# Patient Record
Sex: MALE | Race: ASIAN | NOT HISPANIC OR LATINO
[De-identification: names, ages, dates, MRNs, and addresses within clinical notes are randomized per-mention and may not be internally consistent; named-entity substitution may affect disease eponyms.]

---

## 2023-04-06 PROBLEM — Z00.00 ENCOUNTER FOR PREVENTIVE HEALTH EXAMINATION: Status: ACTIVE | Noted: 2023-04-06

## 2023-04-10 ENCOUNTER — APPOINTMENT (OUTPATIENT)
Dept: SURGERY | Facility: CLINIC | Age: 73
End: 2023-04-10
Payer: MEDICARE

## 2023-04-10 VITALS
HEART RATE: 64 BPM | HEIGHT: 67 IN | BODY MASS INDEX: 30.76 KG/M2 | DIASTOLIC BLOOD PRESSURE: 83 MMHG | SYSTOLIC BLOOD PRESSURE: 125 MMHG | WEIGHT: 196 LBS

## 2023-04-10 DIAGNOSIS — Z86.39 PERSONAL HISTORY OF OTHER ENDOCRINE, NUTRITIONAL AND METABOLIC DISEASE: ICD-10-CM

## 2023-04-10 DIAGNOSIS — K80.20 CALCULUS OF GALLBLADDER W/OUT CHOLECYSTITIS W/OUT OBSTRUCTION: ICD-10-CM

## 2023-04-10 DIAGNOSIS — Z78.9 OTHER SPECIFIED HEALTH STATUS: ICD-10-CM

## 2023-04-10 DIAGNOSIS — Z86.79 PERSONAL HISTORY OF OTHER DISEASES OF THE CIRCULATORY SYSTEM: ICD-10-CM

## 2023-04-10 DIAGNOSIS — Z82.49 FAMILY HISTORY OF ISCHEMIC HEART DISEASE AND OTHER DISEASES OF THE CIRCULATORY SYSTEM: ICD-10-CM

## 2023-04-10 DIAGNOSIS — Z87.891 PERSONAL HISTORY OF NICOTINE DEPENDENCE: ICD-10-CM

## 2023-04-10 DIAGNOSIS — Z87.438 PERSONAL HISTORY OF OTHER DISEASES OF MALE GENITAL ORGANS: ICD-10-CM

## 2023-04-10 PROCEDURE — 99203 OFFICE O/P NEW LOW 30 MIN: CPT

## 2023-04-19 PROBLEM — K80.20 CHOLELITHIASIS: Status: ACTIVE | Noted: 2023-04-19

## 2023-04-19 NOTE — HISTORY OF PRESENT ILLNESS
[de-identified] : Mr. SOO CORREIA is a 72 year  old patient who was referred by Dr. Radha Kaba with the chief complaint of having right upper quadrant and epigastric pain on and off  for 3 months. Pain is  radiating to the back. He reports no nausea or vomiting and no history of jaundice, acholia or choluria.   Appetite is good and weight is stable.   He   has no family history of biliary tract disease.   He had an abdominal sonogram on  03/11/2023 which revealed GB stone. CBD is normal

## 2023-04-19 NOTE — PLAN
[FreeTextEntry1] : Patient is with symptomatic cholelithiasis.   Patient is very hesitant to pursue any surgical intervention.  Given presence of gallstones, laparoscopic, possibly open cholecystectomy was discussed. Patient does not wish to pursue surgery at this time. Patient instructed to maintain a fat-free diet, and to seek immediate medical attention with any acute change or worsening of symptoms, including but not limited to abdominal pain, fever, chills, nausea, vomiting, or yellowing of the skin. Patient’s questions and concerns addressed to patient’s satisfaction.  Patient verbalized an understanding of the information discussed. \par \par

## 2023-04-19 NOTE — CONSULT LETTER
[Dear  ___] : Dear  [unfilled], [Consult Letter:] : I had the pleasure of evaluating your patient, [unfilled]. [Please see my note below.] : Please see my note below. [Consult Closing:] : Thank you very much for allowing me to participate in the care of this patient.  If you have any questions, please do not hesitate to contact me. [Sincerely,] : Sincerely, [FreeTextEntry3] : Shon Valle MD, FACS

## 2023-04-19 NOTE — PHYSICAL EXAM
[Abdominal Masses] : No abdominal masses [Abdomen Tenderness] : ~T ~M No abdominal tenderness [Alert] : alert [Oriented to Person] : oriented to person [Oriented to Place] : oriented to place [Oriented to Time] : oriented to time [Calm] : calm [de-identified] : He  is alert, well-groomed, and in NAD\par   [de-identified] : anicteric.  Nasal mucosa pink, septum midline. Oral mucosa pink.  Tongue midline, Pharynx without exudates.\par   [de-identified] : Neck supple. Trachea midline. Thyroid isthmus barely palpable, lobes not felt.\par

## 2023-05-23 VITALS
TEMPERATURE: 98 F | HEART RATE: 65 BPM | SYSTOLIC BLOOD PRESSURE: 131 MMHG | RESPIRATION RATE: 16 BRPM | HEIGHT: 67 IN | WEIGHT: 195.11 LBS | OXYGEN SATURATION: 98 % | DIASTOLIC BLOOD PRESSURE: 79 MMHG

## 2023-05-23 NOTE — H&P ADULT - NSICDXPASTMEDICALHX_GEN_ALL_CORE_FT
PAST MEDICAL HISTORY:  Afib     BPH (benign prostatic hyperplasia)     HLD (hyperlipidemia)     HTN (hypertension)

## 2023-05-23 NOTE — H&P ADULT - CARDIOVASCULAR
normal/regular rate and rhythm/S1 S2 present/no gallops/no rub/no murmur/vascular bending over/standing

## 2023-05-23 NOTE — H&P ADULT - HISTORY OF PRESENT ILLNESS
Cardio: Dr. Echavarria  Pharmacy:  Escort:    74 yo M with PMHx HTN, HLD, BPH, Afib (on Eliquis, last dose _____) . Pt presented to their outpatient cardiologist, Dr. Echavarria, w/ exertional chest pain x 3 weeks, low exercise tolerance. Patient went to hospital in June 2022 w/ syncope and was diagnosed with afib. Continues to have occasional lightheadedness. Underwent abnormal PETCT w/ Ca score 127 (full report below). Pt denies CP/SOB, dizziness, palpitations, orthopnea/PND, leg swelling, LOC, bleeding, melena/hematochezia, fever, chills, URI symptoms, or recent illness.  In light of pts risk factors, CCS class III anginal symptoms and abnormal PETCT, pt now presents to [facility] for recommended cardiac catheterization with possible intervention if clinically indicated.      PETCT 4/28/23: Ca score 127, Results indicate intermediate (30-69%) likelihood for presence of jeopardized myocardium. LCx: a small to medium sized reversible defect in the lateral wall. RCA: a small nonreversible defect involving the inferior wall, with an adjacent small to medium sized reversible defect in the inferior wall. LVEF rest 77% and LVEF stress 81%. LV wall motion demonstrated mild hypokinesis in the inferior wall. Improvement of wall motion in the inferior wall was seen in the ragadenoson images.        Cardio: Dr. Echavarria  Pharmacy:  Escort:    AFib on eliquis, we have called patient over 3 days and not picking up, voicemail box not activated so cant leave a VM; left VM with emergency contact instructing them to have patient reach out to us. Unable to alert them to stop eliquis.     72 yo M with PMHx HTN, HLD, BPH, Afib (on Eliquis, last dose _____) . Pt presented to their outpatient cardiologist, Dr. Echavarria, w/ exertional chest pain x 3 weeks, low exercise tolerance. Patient went to hospital in June 2022 w/ syncope and was diagnosed with afib. Continues to have occasional lightheadedness. Underwent abnormal PETCT  4/28/23 showing LCx: small to medium sized reversible defect in the lateral wall, RCA small nonreversible defect involving the inferior wall, with an adjacent small to medium sized reversible defect in the inferior wall. LVEF 77% at rest. Mild hypokinesis in inferior wall. Ca score 127 (30-69% likelihood of jeopardized myocardium). Pt otherwise denies _____ CP/SOB, dizziness, palpitations, orthopnea/PND, leg swelling, LOC, bleeding, melena/hematochezia, fever, chills, URI symptoms, or recent illness.  In light of pts risk factors, CCS class III anginal symptoms and abnormal PETCT, pt now presents to [facility] for recommended cardiac catheterization with possible intervention if clinically indicated.     Cardio: Dr. Echavarria  Pharmacy: [facility] [phone]  Escort: Daughter    74yo M former smoker PMHx HTN, HLD, BPH, Afib (on Eliquis, last dose 5/27/23). Patient was admitted 6/2022 for syncope and subsequently diagnosed with afib. Endorses occasional lightheadedness. Underwent abnormal PET CT 4/28/23 showing LCx: small to medium sized reversible defect in the lateral wall, RCA small nonreversible defect involving the inferior wall, with an adjacent small to medium sized reversible defect in the inferior wall. LVEF 77% at rest. Mild hypokinesis in inferior wall. Ca score 127 (30-69% likelihood of jeopardized myocardium). Denies CP/SOB, palpitations, orthopnea/PND, leg swelling, LOC, bleeding, melena/hematochezia, fever, chills, or recent illness.  In light of pts risk factors, CCS class III anginal symptoms and abnormal PET CT, pt now presents to [facility] for recommended cardiac catheterization with possible intervention if clinically indicated.

## 2023-05-23 NOTE — H&P ADULT - ASSESSMENT
74yo M former smoker PMHx HTN, HLD, BPH, Afib (on Eliquis, last dose 5/27/23). Patient was admitted 6/2022 for syncope and subsequently diagnosed with afib. Endorses occasional lightheadedness. Underwent abnormal PET CT 4/28/23 showing LCx: small to medium sized reversible defect in the lateral wall, RCA small nonreversible defect involving the inferior wall, with an adjacent small to medium sized reversible defect in the inferior wall; EF 77% at rest. Mild hypokinesis in inferior wall. Ca score 127 (30-69% likelihood of jeopardized myocardium). In light of pts risk factors, CCS class III anginal symptoms and abnormal PET CT, pt now presents to [facility] for recommended cardiac catheterization with possible intervention if clinically indicated.      EKG: 	NSR 61bpm		  ASA 	III  Mallampati class: IV  Anginal Class: III    -No Known Allergies    -H/H = 14.1/41.6  . Pt denies BRBPR, hematuria, hematochezia, melena. Pt loaded w/ ASA 325mg x1 and Plavix 600mg x1  -BUN/Cr = 17/1.01  . EF 77%. Euvolemic on exam. IV NS @ 250cc bolus followed by 75cc/hr x 2hrs started pre procedure    Sedation Plan:   Moderate  Patient Is Suitable Candidate For Sedation?     Yes    Risks & benefits of procedure and alternative therapy have been explained to the patient including but not limited to: allergic reaction, bleeding with possible need for blood transfusion, infection, renal and vascular compromise, limb damage, arrhythmia, stroke, vessel dissection/perforation, myocardial infarction, and emergent CABG. Informed consent obtained at bedside and included in chart.

## 2023-05-26 RX ORDER — CHLORHEXIDINE GLUCONATE 213 G/1000ML
1 SOLUTION TOPICAL ONCE
Refills: 0 | Status: DISCONTINUED | OUTPATIENT
Start: 2023-05-30 | End: 2023-06-14

## 2023-05-30 ENCOUNTER — OUTPATIENT (OUTPATIENT)
Dept: OUTPATIENT SERVICES | Facility: HOSPITAL | Age: 73
LOS: 1 days | Discharge: HOME | End: 2023-05-30
Payer: MEDICARE

## 2023-05-30 LAB
A1C WITH ESTIMATED AVERAGE GLUCOSE RESULT: 6 % — HIGH (ref 4–5.6)
ALBUMIN SERPL ELPH-MCNC: 4.2 G/DL — SIGNIFICANT CHANGE UP (ref 3.3–5)
ALP SERPL-CCNC: 109 U/L — SIGNIFICANT CHANGE UP (ref 40–120)
ALT FLD-CCNC: 25 U/L — SIGNIFICANT CHANGE UP (ref 10–45)
ANION GAP SERPL CALC-SCNC: 12 MMOL/L — SIGNIFICANT CHANGE UP (ref 5–17)
APTT BLD: 27.2 SEC — LOW (ref 27.5–35.5)
AST SERPL-CCNC: 24 U/L — SIGNIFICANT CHANGE UP (ref 10–40)
BASOPHILS # BLD AUTO: 0.04 K/UL — SIGNIFICANT CHANGE UP (ref 0–0.2)
BASOPHILS NFR BLD AUTO: 0.4 % — SIGNIFICANT CHANGE UP (ref 0–2)
BILIRUB SERPL-MCNC: 0.8 MG/DL — SIGNIFICANT CHANGE UP (ref 0.2–1.2)
BUN SERPL-MCNC: 17 MG/DL — SIGNIFICANT CHANGE UP (ref 7–23)
CALCIUM SERPL-MCNC: 9.2 MG/DL — SIGNIFICANT CHANGE UP (ref 8.4–10.5)
CHLORIDE SERPL-SCNC: 104 MMOL/L — SIGNIFICANT CHANGE UP (ref 96–108)
CHOLEST SERPL-MCNC: 127 MG/DL — SIGNIFICANT CHANGE UP
CK MB CFR SERPL CALC: 2.8 NG/ML — SIGNIFICANT CHANGE UP (ref 0–6.7)
CK SERPL-CCNC: 148 U/L — SIGNIFICANT CHANGE UP (ref 30–200)
CO2 SERPL-SCNC: 23 MMOL/L — SIGNIFICANT CHANGE UP (ref 22–31)
CREAT SERPL-MCNC: 1.01 MG/DL — SIGNIFICANT CHANGE UP (ref 0.5–1.3)
EGFR: 78 ML/MIN/1.73M2 — SIGNIFICANT CHANGE UP
EOSINOPHIL # BLD AUTO: 0.31 K/UL — SIGNIFICANT CHANGE UP (ref 0–0.5)
EOSINOPHIL NFR BLD AUTO: 3 % — SIGNIFICANT CHANGE UP (ref 0–6)
ESTIMATED AVERAGE GLUCOSE: 126 MG/DL — HIGH (ref 68–114)
GLUCOSE SERPL-MCNC: 96 MG/DL — SIGNIFICANT CHANGE UP (ref 70–99)
HCT VFR BLD CALC: 41.6 % — SIGNIFICANT CHANGE UP (ref 39–50)
HDLC SERPL-MCNC: 42 MG/DL — SIGNIFICANT CHANGE UP
HGB BLD-MCNC: 14.1 G/DL — SIGNIFICANT CHANGE UP (ref 13–17)
IMM GRANULOCYTES NFR BLD AUTO: 0.4 % — SIGNIFICANT CHANGE UP (ref 0–0.9)
INR BLD: 1.02 — SIGNIFICANT CHANGE UP (ref 0.88–1.16)
LIPID PNL WITH DIRECT LDL SERPL: 62 MG/DL — SIGNIFICANT CHANGE UP
LYMPHOCYTES # BLD AUTO: 3.27 K/UL — SIGNIFICANT CHANGE UP (ref 1–3.3)
LYMPHOCYTES # BLD AUTO: 31.2 % — SIGNIFICANT CHANGE UP (ref 13–44)
MAGNESIUM SERPL-MCNC: 2 MG/DL — SIGNIFICANT CHANGE UP (ref 1.6–2.6)
MCHC RBC-ENTMCNC: 32 PG — SIGNIFICANT CHANGE UP (ref 27–34)
MCHC RBC-ENTMCNC: 33.9 GM/DL — SIGNIFICANT CHANGE UP (ref 32–36)
MCV RBC AUTO: 94.3 FL — SIGNIFICANT CHANGE UP (ref 80–100)
MONOCYTES # BLD AUTO: 0.72 K/UL — SIGNIFICANT CHANGE UP (ref 0–0.9)
MONOCYTES NFR BLD AUTO: 6.9 % — SIGNIFICANT CHANGE UP (ref 2–14)
NEUTROPHILS # BLD AUTO: 6.11 K/UL — SIGNIFICANT CHANGE UP (ref 1.8–7.4)
NEUTROPHILS NFR BLD AUTO: 58.1 % — SIGNIFICANT CHANGE UP (ref 43–77)
NON HDL CHOLESTEROL: 85 MG/DL — SIGNIFICANT CHANGE UP
NRBC # BLD: 0 /100 WBCS — SIGNIFICANT CHANGE UP (ref 0–0)
PLATELET # BLD AUTO: 236 K/UL — SIGNIFICANT CHANGE UP (ref 150–400)
POTASSIUM SERPL-MCNC: 3.7 MMOL/L — SIGNIFICANT CHANGE UP (ref 3.5–5.3)
POTASSIUM SERPL-SCNC: 3.7 MMOL/L — SIGNIFICANT CHANGE UP (ref 3.5–5.3)
PROT SERPL-MCNC: 7.3 G/DL — SIGNIFICANT CHANGE UP (ref 6–8.3)
PROTHROM AB SERPL-ACNC: 12.2 SEC — SIGNIFICANT CHANGE UP (ref 10.5–13.4)
RBC # BLD: 4.41 M/UL — SIGNIFICANT CHANGE UP (ref 4.2–5.8)
RBC # FLD: 12.3 % — SIGNIFICANT CHANGE UP (ref 10.3–14.5)
SODIUM SERPL-SCNC: 139 MMOL/L — SIGNIFICANT CHANGE UP (ref 135–145)
TRIGL SERPL-MCNC: 115 MG/DL — SIGNIFICANT CHANGE UP
WBC # BLD: 10.49 K/UL — SIGNIFICANT CHANGE UP (ref 3.8–10.5)
WBC # FLD AUTO: 10.49 K/UL — SIGNIFICANT CHANGE UP (ref 3.8–10.5)

## 2023-05-30 PROCEDURE — 94010 BREATHING CAPACITY TEST: CPT | Mod: 26

## 2023-05-30 PROCEDURE — 82550 ASSAY OF CK (CPK): CPT

## 2023-05-30 PROCEDURE — 93454 CORONARY ARTERY ANGIO S&I: CPT

## 2023-05-30 PROCEDURE — 99205 OFFICE O/P NEW HI 60 MIN: CPT

## 2023-05-30 PROCEDURE — 36415 COLL VENOUS BLD VENIPUNCTURE: CPT

## 2023-05-30 PROCEDURE — 93005 ELECTROCARDIOGRAM TRACING: CPT

## 2023-05-30 PROCEDURE — 99152 MOD SED SAME PHYS/QHP 5/>YRS: CPT

## 2023-05-30 PROCEDURE — 80061 LIPID PANEL: CPT

## 2023-05-30 PROCEDURE — 85025 COMPLETE CBC W/AUTO DIFF WBC: CPT

## 2023-05-30 PROCEDURE — 83735 ASSAY OF MAGNESIUM: CPT

## 2023-05-30 PROCEDURE — 83036 HEMOGLOBIN GLYCOSYLATED A1C: CPT

## 2023-05-30 PROCEDURE — 93454 CORONARY ARTERY ANGIO S&I: CPT | Mod: 26

## 2023-05-30 PROCEDURE — 94150 VITAL CAPACITY TEST: CPT

## 2023-05-30 PROCEDURE — C1769: CPT

## 2023-05-30 PROCEDURE — C1894: CPT

## 2023-05-30 PROCEDURE — 82553 CREATINE MB FRACTION: CPT

## 2023-05-30 PROCEDURE — 99153 MOD SED SAME PHYS/QHP EA: CPT

## 2023-05-30 PROCEDURE — 80053 COMPREHEN METABOLIC PANEL: CPT

## 2023-05-30 PROCEDURE — C1887: CPT

## 2023-05-30 PROCEDURE — 85610 PROTHROMBIN TIME: CPT

## 2023-05-30 PROCEDURE — 93010 ELECTROCARDIOGRAM REPORT: CPT

## 2023-05-30 PROCEDURE — 85730 THROMBOPLASTIN TIME PARTIAL: CPT

## 2023-05-30 RX ORDER — SODIUM CHLORIDE 9 MG/ML
1000 INJECTION INTRAMUSCULAR; INTRAVENOUS; SUBCUTANEOUS
Refills: 0 | Status: DISCONTINUED | OUTPATIENT
Start: 2023-05-30 | End: 2023-06-14

## 2023-05-30 RX ORDER — SODIUM CHLORIDE 9 MG/ML
250 INJECTION INTRAMUSCULAR; INTRAVENOUS; SUBCUTANEOUS ONCE
Refills: 0 | Status: COMPLETED | OUTPATIENT
Start: 2023-05-30 | End: 2023-05-30

## 2023-05-30 RX ORDER — ASPIRIN/CALCIUM CARB/MAGNESIUM 324 MG
325 TABLET ORAL ONCE
Refills: 0 | Status: COMPLETED | OUTPATIENT
Start: 2023-05-30 | End: 2023-05-30

## 2023-05-30 RX ORDER — POTASSIUM CHLORIDE 20 MEQ
40 PACKET (EA) ORAL ONCE
Refills: 0 | Status: COMPLETED | OUTPATIENT
Start: 2023-05-30 | End: 2023-05-30

## 2023-05-30 RX ORDER — CLOPIDOGREL BISULFATE 75 MG/1
600 TABLET, FILM COATED ORAL ONCE
Refills: 0 | Status: COMPLETED | OUTPATIENT
Start: 2023-05-30 | End: 2023-05-30

## 2023-05-30 RX ORDER — SODIUM CHLORIDE 9 MG/ML
500 INJECTION INTRAMUSCULAR; INTRAVENOUS; SUBCUTANEOUS
Refills: 0 | Status: DISCONTINUED | OUTPATIENT
Start: 2023-05-30 | End: 2023-06-14

## 2023-05-30 RX ADMIN — CLOPIDOGREL BISULFATE 600 MILLIGRAM(S): 75 TABLET, FILM COATED ORAL at 13:53

## 2023-05-30 RX ADMIN — SODIUM CHLORIDE 75 MILLILITER(S): 9 INJECTION INTRAMUSCULAR; INTRAVENOUS; SUBCUTANEOUS at 13:54

## 2023-05-30 RX ADMIN — SODIUM CHLORIDE 1000 MILLILITER(S): 9 INJECTION INTRAMUSCULAR; INTRAVENOUS; SUBCUTANEOUS at 13:54

## 2023-05-30 RX ADMIN — Medication 325 MILLIGRAM(S): at 13:53

## 2023-05-30 RX ADMIN — Medication 40 MILLIEQUIVALENT(S): at 13:53

## 2023-05-30 NOTE — PROGRESS NOTE ADULT - SUBJECTIVE AND OBJECTIVE BOX
Interventional Cardiology PA SDA Discharge Note    Patient without complaints. Ambulated and voided without difficulties    Afebrile, VSS    Ext:    	Right Radial : no  hematoma, no  bleeding, dressing; C/D/I      Pulses:    intact RAD 2+         A/P  72yo M former smoker PMHx HTN, HLD, BPH, Afib (on Eliquis, last dose 5/27/23). Patient was admitted 6/2022 for syncope and subsequently diagnosed with afib. Endorses occasional lightheadedness. Underwent abnormal PET CT 4/28/23 showing LCx: small to medium sized reversible defect in the lateral wall, RCA small nonreversible defect involving the inferior wall, with an adjacent small to medium sized reversible defect in the inferior wall; EF 77% at rest. Mild hypokinesis in inferior wall. Ca score 127 (30-69% likelihood of jeopardized myocardium). In light of pts risk factors, CCS class III anginal symptoms and abnormal PET CT, pt now presents to [facility] for recommended cardiac catheterization with possible intervention if clinically indicated.      s/p cardiac cath 5/30/23 ( Bret/Jewel) RRA access  LM - 60-65% stenosis,   oLcx/LAD 75=8-%,   moderate pLAD disease and dLcx   pRCA 40-45%   RPL 99% L-R collaterals   CTS eval with Dr Stevenson for midCAB      1.	Stable for discharge as per attending Dr. Queen  after  seen by CTS consult, bed rest, wrist stable and 30 minutes of ambulation.  2.	To return as outpatient for MidCAGB with Dr Stevenson - date to be determined, CTS to call patient with details  3.	Discharged forms signed and copies in chart    Interventional Cardiology PA SDA Discharge Note    Patient without complaints. Ambulated and voided without difficulties    Afebrile, VSS    Ext:    	Right Radial : no  hematoma, no  bleeding, dressing; C/D/I      Pulses:    intact RAD 2+         A/P  74yo M former smoker PMHx HTN, HLD, BPH, Afib (on Eliquis, last dose 5/27/23). Patient was admitted 6/2022 for syncope and subsequently diagnosed with afib. Endorses occasional lightheadedness. Underwent abnormal PET CT 4/28/23 showing LCx: small to medium sized reversible defect in the lateral wall, RCA small nonreversible defect involving the inferior wall, with an adjacent small to medium sized reversible defect in the inferior wall; EF 77% at rest. Mild hypokinesis in inferior wall. Ca score 127 (30-69% likelihood of jeopardized myocardium). In light of pts risk factors, CCS class III anginal symptoms and abnormal PET CT, pt now presents to [facility] for recommended cardiac catheterization with possible intervention if clinically indicated.      s/p cardiac cath 5/30/23 ( Bret/Jewel) RRA access  LM - 60-65% stenosis,   oLcx/LAD 75=8-%,   moderate pLAD disease and dLcx   pRCA 40-45%   RPL    L-R collaterals   CTS eval with Dr Stevenson for midCAB (with staged PCI after)        1.	Stable for discharge as per attending Dr. Queen  after  seen by CTS consult, bed rest, wrist stable and 30 minutes of ambulation.  2.	To return as outpatient for MidCAGB with Dr Stevenson - date to be determined, CTS to call patient with details  3.	Discharged forms signed and copies in chart

## 2023-05-31 DIAGNOSIS — I48.19 OTHER PERSISTENT ATRIAL FIBRILLATION: ICD-10-CM

## 2023-05-31 DIAGNOSIS — I25.10 ATHEROSCLEROTIC HEART DISEASE OF NATIVE CORONARY ARTERY W/OUT ANGINA PECTORIS: ICD-10-CM

## 2023-05-31 PROBLEM — I10 ESSENTIAL (PRIMARY) HYPERTENSION: Chronic | Status: ACTIVE | Noted: 2023-05-23

## 2023-05-31 PROBLEM — N40.0 BENIGN PROSTATIC HYPERPLASIA WITHOUT LOWER URINARY TRACT SYMPTOMS: Chronic | Status: ACTIVE | Noted: 2023-05-23

## 2023-05-31 PROBLEM — I48.91 UNSPECIFIED ATRIAL FIBRILLATION: Chronic | Status: ACTIVE | Noted: 2023-05-23

## 2023-05-31 PROBLEM — E78.5 HYPERLIPIDEMIA, UNSPECIFIED: Chronic | Status: ACTIVE | Noted: 2023-05-23

## 2023-05-31 NOTE — CONSULT NOTE ADULT - ASSESSMENT
72 yo male with PMH of HTN, HLD, BPH and AFib(eliquis) presents with class III angina and abnormal PET/CT with 3 vessel CAD. Dr. Stevenson reviewed the cardiac cath imaging and reports with the patient and  his daughter and discussed the case with Dr. Queen.  Dr. Stevenson performed the STS risk calculator and quoted a 1% operative mortality and complication risk and discussed the STS risk factors with the patient including but not limited to death, heart attack, bleeding, stroke, kidney problems and infection.He also discussed the various approaches, minimally invasive versus sternotomy. Dr. Stevenson feels the patient will benefit and is a candidate for robotic assisted MIDCAB (LIMA->LAD) and left atrial appendage AtriClip as part of hybrid procedure. All questions were addressed.    Plan:   discharge home today  Admit on 6/15 for IV heparin and PST  OR on 6/16-->MIDCAB, Left atrial appendage AtriClip  6/20 PCI of LCX by Dr. Queen

## 2023-05-31 NOTE — CONSULT NOTE ADULT - SUBJECTIVE AND OBJECTIVE BOX
72yo M former smoker PMHx HTN, HLD, BPH, Afib (on Eliquis, last dose 5/27/23). Patient was admitted 6/2022 for syncope and subsequently diagnosed with afib. Endorses occasional lightheadedness. Underwent abnormal PET CT 4/28/23 showing LCx: small to medium sized reversible defect in the lateral wall, RCA small nonreversible defect involving the inferior wall, with an adjacent small to medium sized reversible defect in the inferior wall. LVEF 77% at rest. Mild hypokinesis in inferior wall. Ca score 127 (30-69% likelihood of jeopardized myocardium). Denies CP/SOB, palpitations, orthopnea/PND, leg swelling, LOC, bleeding, melena/hematochezia, fever, chills, or recent illness.   5/20/23 s/p cardiac cath that revealed dLM - 60%, ostial/prox LAD 80%, mid LAD 65% (severely calcified), ostial LCX 80%, mid LCX 65%, prox RCA 40%, distal RCA 30%, %  w/left-right collaterals. Dr. Stevenson consulted for consideration for hybrid procedure.     Review of Systems:  Other Review of Systems: All other review of systems negative, except as noted in HPI      Allergies and Intolerances:        Allergies:  	No Known Allergies:     Home Medications:   * Patient Currently Takes Medications as of 30-May-2023 13:54 documented in Structured Notes  · 	metoprolol succinate 50 mg oral capsule, extended release: Last Dose Taken: 30-May-2023 AM, 1 orally once a day  · 	felodipine 5 mg oral tablet, extended release: Last Dose Taken: 29-May-2023 AM, 1 orally once a day  · 	losartan 100 mg oral tablet: Last Dose Taken: 30-May-2023 AM, 1 orally once a day  · 	atorvastatin 40 mg oral tablet: Last Dose Taken: 29-May-2023 AM, 1 orally once a day  · 	tamsulosin 0.4 mg oral capsule: Last Dose Taken: 29-May-2023 AM, 1 orally once a day  · 	Eliquis 5 mg oral tablet: Last Dose Taken: 27-May-2023 , 1 orally 2 times a day    Patient History:   Past Medical, Past Surgical, and Family History:  PAST MEDICAL HISTORY:  Afib     BPH (benign prostatic hyperplasia)     HLD (hyperlipidemia)     HTN (hypertension).     PAST SURGICAL HISTORY:  No significant past surgical history.     FAMILY HISTORY:  No pertinent family history in first degree relatives. No pertinent family history of: coronary disease.    Social History:  · Substance use	No   · Social History (marital status, living situation, occupation, and sexual history)	Former smoker 1ppd x 30 years  Social ETOH use  Denies recreational drug use    Tobacco Screening:  · Core Measure Site	No    Risk Assessment:   Present on Admission:  Deep Venous Thrombosis	no   Pulmonary Embolus	no     HIV Screening:  · In accordance with [place] State law, we offer every patient who comes to our ED an HIV test. Would you like to be tested today?	Opt out     Physical Exam:   Height/Weight/BSA/BMI:  · Height (FEET)	5 Feet  · Height (INCHES)	7 Inch(s)  · Height (CENTIMETERS)	170.18 Centimeter(s)  · 	 used   · Dosing Weight (KILOGRAMS)	88.5 kg  · Dosing Weight  (POUNDS)	195.1 Pound(s)  · BSA (m2)	2 Meter Squared  · BMI (kG/m2)	30.6     T/HR/RR/BP:  · Temp (F)	97.9 Degrees F  · Temp (C)	36.6 Degrees C  · Heart Rate	65 /min  · Respiration Rate (breaths/min)	16 /min  · BP Systolic	131 mm Hg  · BP Diastolic	79 mm Hg  · BP Noninvasive Mean	96 mm Hg  · SpO2 (%)	98 %  · O2 Delivery/Oxygen Delivery Method	room air    Physical Exam:  · Constitutional	well-groomed; no distress  · Eyes	PERRL; EOMI; conjunctiva clear  · Respiratory	clear to auscultation bilaterally; no wheezes; no rales; no rhonchi  · Cardiovascular	regular rate and rhythm; S1 S2 present; no gallops; no rub; no murmur; vascular  · Radial Pulse	2+ b/l  · Femoral Pulse	2+ b/l no bruit  · DP Pulse	2+ b/l  · PT Pulse	1+ b/l  · Gastrointestinal	soft; nontender; nondistended; normal active bowel sounds  · Neurological	cranial nerves II-XII intact; sensation intact; responds to verbal commands  · Skin	warm and dry; color normal; no rashes; no ulcers  · Lymphatic	No lymphadedenopathy  · Musculoskeletal	normal; normal gait; ROM intact; strength 5/5 bilateral upper extremities; strength 5/5 bilateral lower extremities  · Psychiatric	normal affect; alert and oriented x3; normal behavior      Labs and Results:  Labs, Radiology, Cardiology, and Other Results: 14.1   10.49 )-----------( 236      ( 30 May 2023 12:51 )             41.6       05-30    139  |  104  |  17  ----------------------------<  96  3.7   |  23  |  1.01    Ca    9.2      30 May 2023 12:51  Mg     2.0     05-30    TPro  7.3  /  Alb  4.2  /  TBili  0.8  /  DBili  x   /  AST  24  /  ALT  25  /  AlkPhos  109  05-30      PT/INR - ( 30 May 2023 12:51 )   PT: 12.2 sec;   INR: 1.02          PTT - ( 30 May 2023 12:51 )  PTT:27.2 sec    CARDIAC MARKERS ( 30 May 2023 12:51 )  x     / x     / 148 U/L / x     / 2.8 ng/mL

## 2023-06-05 DIAGNOSIS — I25.110 ATHEROSCLEROTIC HEART DISEASE OF NATIVE CORONARY ARTERY WITH UNSTABLE ANGINA PECTORIS: ICD-10-CM

## 2023-06-05 DIAGNOSIS — I25.82 CHRONIC TOTAL OCCLUSION OF CORONARY ARTERY: ICD-10-CM

## 2023-06-05 DIAGNOSIS — R94.39 ABNORMAL RESULT OF OTHER CARDIOVASCULAR FUNCTION STUDY: ICD-10-CM

## 2023-06-12 ENCOUNTER — TRANSCRIPTION ENCOUNTER (OUTPATIENT)
Age: 73
End: 2023-06-12

## 2023-06-15 ENCOUNTER — TRANSCRIPTION ENCOUNTER (OUTPATIENT)
Age: 73
End: 2023-06-15

## 2023-06-15 ENCOUNTER — INPATIENT (INPATIENT)
Facility: HOSPITAL | Age: 73
LOS: 4 days | Discharge: HOME HEALTH | DRG: 232 | End: 2023-06-20
Attending: THORACIC SURGERY (CARDIOTHORACIC VASCULAR SURGERY) | Admitting: THORACIC SURGERY (CARDIOTHORACIC VASCULAR SURGERY)
Payer: MEDICARE

## 2023-06-15 VITALS
HEART RATE: 76 BPM | OXYGEN SATURATION: 96 % | SYSTOLIC BLOOD PRESSURE: 127 MMHG | DIASTOLIC BLOOD PRESSURE: 86 MMHG | RESPIRATION RATE: 18 BRPM

## 2023-06-15 LAB
A1C WITH ESTIMATED AVERAGE GLUCOSE RESULT: 5.8 % — HIGH (ref 4–5.6)
ALBUMIN SERPL ELPH-MCNC: 3.9 G/DL — SIGNIFICANT CHANGE UP (ref 3.3–5)
ALP SERPL-CCNC: 121 U/L — HIGH (ref 40–120)
ALT FLD-CCNC: 18 U/L — SIGNIFICANT CHANGE UP (ref 10–45)
ANION GAP SERPL CALC-SCNC: 10 MMOL/L — SIGNIFICANT CHANGE UP (ref 5–17)
APTT BLD: 29.7 SEC — SIGNIFICANT CHANGE UP (ref 27.5–35.5)
AST SERPL-CCNC: 21 U/L — SIGNIFICANT CHANGE UP (ref 10–40)
BASOPHILS # BLD AUTO: 0.04 K/UL — SIGNIFICANT CHANGE UP (ref 0–0.2)
BASOPHILS NFR BLD AUTO: 0.4 % — SIGNIFICANT CHANGE UP (ref 0–2)
BILIRUB SERPL-MCNC: 0.4 MG/DL — SIGNIFICANT CHANGE UP (ref 0.2–1.2)
BLD GP AB SCN SERPL QL: NEGATIVE — SIGNIFICANT CHANGE UP
BLD GP AB SCN SERPL QL: NEGATIVE — SIGNIFICANT CHANGE UP
BUN SERPL-MCNC: 17 MG/DL — SIGNIFICANT CHANGE UP (ref 7–23)
CALCIUM SERPL-MCNC: 8.9 MG/DL — SIGNIFICANT CHANGE UP (ref 8.4–10.5)
CHLORIDE SERPL-SCNC: 104 MMOL/L — SIGNIFICANT CHANGE UP (ref 96–108)
CHOLEST SERPL-MCNC: 120 MG/DL — SIGNIFICANT CHANGE UP
CO2 SERPL-SCNC: 24 MMOL/L — SIGNIFICANT CHANGE UP (ref 22–31)
CREAT SERPL-MCNC: 0.99 MG/DL — SIGNIFICANT CHANGE UP (ref 0.5–1.3)
EGFR: 80 ML/MIN/1.73M2 — SIGNIFICANT CHANGE UP
EOSINOPHIL # BLD AUTO: 0.16 K/UL — SIGNIFICANT CHANGE UP (ref 0–0.5)
EOSINOPHIL NFR BLD AUTO: 1.8 % — SIGNIFICANT CHANGE UP (ref 0–6)
ESTIMATED AVERAGE GLUCOSE: 120 MG/DL — HIGH (ref 68–114)
GAS PNL BLDA: SIGNIFICANT CHANGE UP
GLUCOSE SERPL-MCNC: 91 MG/DL — SIGNIFICANT CHANGE UP (ref 70–99)
HCT VFR BLD CALC: 38.6 % — LOW (ref 39–50)
HDLC SERPL-MCNC: 33 MG/DL — LOW
HGB BLD-MCNC: 13.6 G/DL — SIGNIFICANT CHANGE UP (ref 13–17)
IMM GRANULOCYTES NFR BLD AUTO: 0.2 % — SIGNIFICANT CHANGE UP (ref 0–0.9)
INR BLD: 0.99 — SIGNIFICANT CHANGE UP (ref 0.88–1.16)
LIPID PNL WITH DIRECT LDL SERPL: 35 MG/DL — SIGNIFICANT CHANGE UP
LYMPHOCYTES # BLD AUTO: 2.34 K/UL — SIGNIFICANT CHANGE UP (ref 1–3.3)
LYMPHOCYTES # BLD AUTO: 26.1 % — SIGNIFICANT CHANGE UP (ref 13–44)
MAGNESIUM SERPL-MCNC: 2.1 MG/DL — SIGNIFICANT CHANGE UP (ref 1.6–2.6)
MCHC RBC-ENTMCNC: 32.8 PG — SIGNIFICANT CHANGE UP (ref 27–34)
MCHC RBC-ENTMCNC: 35.2 GM/DL — SIGNIFICANT CHANGE UP (ref 32–36)
MCV RBC AUTO: 93 FL — SIGNIFICANT CHANGE UP (ref 80–100)
MONOCYTES # BLD AUTO: 0.56 K/UL — SIGNIFICANT CHANGE UP (ref 0–0.9)
MONOCYTES NFR BLD AUTO: 6.2 % — SIGNIFICANT CHANGE UP (ref 2–14)
NEUTROPHILS # BLD AUTO: 5.86 K/UL — SIGNIFICANT CHANGE UP (ref 1.8–7.4)
NEUTROPHILS NFR BLD AUTO: 65.3 % — SIGNIFICANT CHANGE UP (ref 43–77)
NON HDL CHOLESTEROL: 87 MG/DL — SIGNIFICANT CHANGE UP
NRBC # BLD: 0 /100 WBCS — SIGNIFICANT CHANGE UP (ref 0–0)
NT-PROBNP SERPL-SCNC: 67 PG/ML — SIGNIFICANT CHANGE UP (ref 0–300)
PLATELET # BLD AUTO: 222 K/UL — SIGNIFICANT CHANGE UP (ref 150–400)
POTASSIUM SERPL-MCNC: 4.1 MMOL/L — SIGNIFICANT CHANGE UP (ref 3.5–5.3)
POTASSIUM SERPL-SCNC: 4.1 MMOL/L — SIGNIFICANT CHANGE UP (ref 3.5–5.3)
PROT SERPL-MCNC: 7.2 G/DL — SIGNIFICANT CHANGE UP (ref 6–8.3)
PROTHROM AB SERPL-ACNC: 11.8 SEC — SIGNIFICANT CHANGE UP (ref 10.5–13.4)
RBC # BLD: 4.15 M/UL — LOW (ref 4.2–5.8)
RBC # FLD: 12 % — SIGNIFICANT CHANGE UP (ref 10.3–14.5)
RH IG SCN BLD-IMP: POSITIVE — SIGNIFICANT CHANGE UP
RH IG SCN BLD-IMP: POSITIVE — SIGNIFICANT CHANGE UP
SODIUM SERPL-SCNC: 138 MMOL/L — SIGNIFICANT CHANGE UP (ref 135–145)
TRIGL SERPL-MCNC: 260 MG/DL — HIGH
TROPONIN T, HIGH SENSITIVITY RESULT: 13 NG/L — SIGNIFICANT CHANGE UP (ref 0–51)
TSH SERPL-MCNC: 1.89 UIU/ML — SIGNIFICANT CHANGE UP (ref 0.27–4.2)
WBC # BLD: 8.98 K/UL — SIGNIFICANT CHANGE UP (ref 3.8–10.5)
WBC # FLD AUTO: 8.98 K/UL — SIGNIFICANT CHANGE UP (ref 3.8–10.5)

## 2023-06-15 PROCEDURE — 71046 X-RAY EXAM CHEST 2 VIEWS: CPT | Mod: 26

## 2023-06-15 PROCEDURE — 94010 BREATHING CAPACITY TEST: CPT | Mod: 26

## 2023-06-15 PROCEDURE — 93880 EXTRACRANIAL BILAT STUDY: CPT | Mod: 26

## 2023-06-15 PROCEDURE — 93306 TTE W/DOPPLER COMPLETE: CPT | Mod: 26

## 2023-06-15 RX ORDER — TAMSULOSIN HYDROCHLORIDE 0.4 MG/1
1 CAPSULE ORAL
Refills: 0 | DISCHARGE

## 2023-06-15 RX ORDER — SODIUM CHLORIDE 9 MG/ML
3 INJECTION INTRAMUSCULAR; INTRAVENOUS; SUBCUTANEOUS EVERY 8 HOURS
Refills: 0 | Status: DISCONTINUED | OUTPATIENT
Start: 2023-06-15 | End: 2023-06-16

## 2023-06-15 RX ORDER — METOPROLOL TARTRATE 50 MG
25 TABLET ORAL EVERY 12 HOURS
Refills: 0 | Status: DISCONTINUED | OUTPATIENT
Start: 2023-06-15 | End: 2023-06-16

## 2023-06-15 RX ORDER — CHLORHEXIDINE GLUCONATE 213 G/1000ML
1 SOLUTION TOPICAL ONCE
Refills: 0 | Status: COMPLETED | OUTPATIENT
Start: 2023-06-15 | End: 2023-06-15

## 2023-06-15 RX ORDER — ACETAMINOPHEN 500 MG
650 TABLET ORAL EVERY 6 HOURS
Refills: 0 | Status: DISCONTINUED | OUTPATIENT
Start: 2023-06-15 | End: 2023-06-16

## 2023-06-15 RX ORDER — CHLORHEXIDINE GLUCONATE 213 G/1000ML
1 SOLUTION TOPICAL ONCE
Refills: 0 | Status: COMPLETED | OUTPATIENT
Start: 2023-06-16 | End: 2023-06-16

## 2023-06-15 RX ORDER — ASPIRIN/CALCIUM CARB/MAGNESIUM 324 MG
81 TABLET ORAL DAILY
Refills: 0 | Status: DISCONTINUED | OUTPATIENT
Start: 2023-06-15 | End: 2023-06-16

## 2023-06-15 RX ORDER — CHLORHEXIDINE GLUCONATE 213 G/1000ML
10 SOLUTION TOPICAL ONCE
Refills: 0 | Status: COMPLETED | OUTPATIENT
Start: 2023-06-15 | End: 2023-06-16

## 2023-06-15 RX ORDER — HEPARIN SODIUM 5000 [USP'U]/ML
1000 INJECTION INTRAVENOUS; SUBCUTANEOUS
Qty: 25000 | Refills: 0 | Status: DISCONTINUED | OUTPATIENT
Start: 2023-06-15 | End: 2023-06-16

## 2023-06-15 RX ORDER — PANTOPRAZOLE SODIUM 20 MG/1
40 TABLET, DELAYED RELEASE ORAL
Refills: 0 | Status: DISCONTINUED | OUTPATIENT
Start: 2023-06-15 | End: 2023-06-16

## 2023-06-15 RX ORDER — POLYETHYLENE GLYCOL 3350 17 G/17G
17 POWDER, FOR SOLUTION ORAL DAILY
Refills: 0 | Status: DISCONTINUED | OUTPATIENT
Start: 2023-06-15 | End: 2023-06-16

## 2023-06-15 RX ORDER — TAMSULOSIN HYDROCHLORIDE 0.4 MG/1
0.4 CAPSULE ORAL AT BEDTIME
Refills: 0 | Status: DISCONTINUED | OUTPATIENT
Start: 2023-06-15 | End: 2023-06-16

## 2023-06-15 RX ORDER — ATORVASTATIN CALCIUM 80 MG/1
40 TABLET, FILM COATED ORAL AT BEDTIME
Refills: 0 | Status: DISCONTINUED | OUTPATIENT
Start: 2023-06-15 | End: 2023-06-16

## 2023-06-15 RX ADMIN — HEPARIN SODIUM 10 UNIT(S)/HR: 5000 INJECTION INTRAVENOUS; SUBCUTANEOUS at 18:32

## 2023-06-15 RX ADMIN — CHLORHEXIDINE GLUCONATE 1 APPLICATION(S): 213 SOLUTION TOPICAL at 22:53

## 2023-06-15 RX ADMIN — TAMSULOSIN HYDROCHLORIDE 0.4 MILLIGRAM(S): 0.4 CAPSULE ORAL at 21:17

## 2023-06-15 RX ADMIN — ATORVASTATIN CALCIUM 40 MILLIGRAM(S): 80 TABLET, FILM COATED ORAL at 21:17

## 2023-06-15 RX ADMIN — CHLORHEXIDINE GLUCONATE 1 APPLICATION(S): 213 SOLUTION TOPICAL at 19:42

## 2023-06-15 RX ADMIN — SODIUM CHLORIDE 3 MILLILITER(S): 9 INJECTION INTRAMUSCULAR; INTRAVENOUS; SUBCUTANEOUS at 21:02

## 2023-06-15 RX ADMIN — Medication 81 MILLIGRAM(S): at 21:17

## 2023-06-15 NOTE — H&P ADULT - ASSESSMENT
A/P:    72yo English/ Tagalog speaking M, former smoker PMHx HTN, HLD, BPH, Afib (on Eliquis). Patient was admitted 6/2022 for syncope and subsequently diagnosed with afib. Underwent cardiac cath 5/20/23 that revealed dLM - 60%, ostial/prox LAD 80%, mid LAD 65% (severely calcified), ostial LCX 80%, mid LCX 65%, prox RCA 40%, distal RCA 30%, %  w/left-right collaterals. Dr. Stevenson consulted for consideration for hybrid procedure, deemed a good candidate for intervention. On 6/15/23 he presented to [facility] for pre operative workup prior to his scheduled procedure.     Neurovascular:   - No delirium. Pain well controlled with current regimen.  -Continue tylenol PRN    Cardiovascular:   - Pre op for MIDCAB tomorrow with plan for PCI later this admission   -Hemodynamically stable. HR controlled (59-76)  - Hx AF: to start heparin gtt, on Eliquis at home   - Hx of HTN, BP controlled (127/86), continue metoprolol 25 q12 hours   - CAD: continue ASA, atorvastatin 40 mg     Respiratory:   - 02 Sat = 98% on RA.  -Encourage C+DB and Use of IS 10x / hr while awake.  -CXR PA/Lat clear, no consolidations     GI:   - Stable.  -NPO after MN for OR tomorrow   -Continue GI PPX with protonix  -PO Diet.    Renal / :  - BUN/Cr stable at 17/0.99  -Continue to monitor renal function.  -Monitor I/O's.  - Replete electrolytes PRN    Endocrine:    -A1c 5.8, no known hx DM  -TSH 1.890, no known hx thyroid disease     Hematologic:  -H/H stable at 13.6/38.6 with no obvious signs of bleeding  - Hep gtt pre op for hx AF  -Coagulation Panel.    ID:  -Pt remains afebrile with no elevation in WBC or signs of infection  -Continue to monitor CBC  -Observe for SIRS/Sepsis Syndrome.    Prophylaxis:  -DVT prophylaxis with heparin gtt   -SCD's    Disposition:  -OR tomorrow

## 2023-06-15 NOTE — PRE-ANESTHESIA EVALUATION ADULT - NSANTHADDINFOFT_GEN_ALL_CORE
H&P Adult [Charted Location: [facility] 03PO 08] [Authored: 15-Marito-2023 17:23]- for Visit: [phone], Complete, Not Revised, Signed in Full, Available to Patient    History and Physical:   Source of Information	Chart(s), Patient       Patient Identity:  · Birth Sex	Male  · Patient's Sexual Orientation	Heterosexual  · Patient's Gender Identity	Male  · Patient's Preferred Pronoun	Him/He     History of Present Illness:  Reason for Admission: CAD  History of Present Illness:   74yo English/ Tagalog speaking M, former smoker PMHx HTN, HLD, BPH, Afib (on Eliquis). Patient was admitted 6/2022 for syncope and subsequently diagnosed with afib. Underwent cardiac cath 5/20/23 that revealed dLM - 60%, ostial/prox LAD 80%, mid LAD 65% (severely calcified), ostial LCX 80%, mid LCX 65%, prox RCA 40%, distal RCA 30%, %  w/left-right collaterals. Dr. Stevenson consulted for consideration for hybrid procedure, deemed a good candidate for intervention. On 6/15/23 he presented to [facility] for pre operative workup prior to his scheduled procedure.          Review of Systems:  Review of Systems: Review of Systems  CONSTITUTIONAL:  Denies fevers / chills, sweats, fatigue, weight loss, weight gain                                      NEURO:  Denies paresthesias, seizures, syncope, confusion                                                                                EYES:  Denies blurry vision, discharge, pain, loss of vision                                                                                    ENMT:  Denies difficulty hearing, vertigo, dysphagia, epistaxis, recent dental work                                       CV:  Denies chest pain, palpitations, COATS, orthopnea                                                                                          RESPIRATORY:  Denies wheezing, SOB, cough / sputum, hemoptysis                                                                GI:  Denies nausea, vomiting, diarrhea, constipation, melena, difficulty swallowing                                             : Denies hematuria, dysuria, urgency, incontinence                                                                                         MUSCULOSKELETAL:  Denies arthritis, joint swelling, muscle weakness                                                             SKIN/BREAST:  Denies rash, itching, hair loss, masses                                                                                            PSYCH:  Denies depression, anxiety, suicidal ideation                                                                                               HEME/LYMPH:  Denies bruises easily, enlarged lymph nodes, tender lymph nodes                                        ENDOCRINE:  Denies cold intolerance, heat intolerance, polydipsia      Allergies and Intolerances:        Allergies:  	No Known Allergies:     Home Medications:   * Patient Currently Takes Medications as of 15-Marito-2023 17:29 documented in Structured Notes  · 	metoprolol succinate 50 mg oral capsule, extended release: Last Dose Taken:  , 1 orally once a day  · 	felodipine 5 mg oral tablet, extended release: Last Dose Taken:  , 1 orally once a day  · 	losartan 100 mg oral tablet: Last Dose Taken:  , 1 orally once a day  · 	atorvastatin 40 mg oral tablet: Last Dose Taken:  , 1 orally once a day  · 	tamsulosin 0.4 mg oral capsule: Last Dose Taken:  , 1 orally once a day  · 	Eliquis 5 mg oral tablet: Last Dose Taken:  , 1 orally 2 times a day    .    Patient History:    Past Medical, Past Surgical, and Family History:  PAST MEDICAL HISTORY:  Afib     BPH (benign prostatic hyperplasia)     HLD (hyperlipidemia)     HTN (hypertension).     PAST SURGICAL HISTORY:  No significant past surgical history.     FAMILY HISTORY:  No pertinent family history in first degree relatives. No pertinent family history of: asthma.     Social History:  · Substance use	No  · Social History (marital status, living situation, occupation, and sexual history)	Social History  Smoker:   Former, quit  20 years ago, 2 PPD previously X 30 years   ETOH Use:   NO     Ilicit Drug Use:   NO  Occupation: N/A  Assistant Devices:   None  Lives with: Daughter     Tobacco Screening:  · Core Measure Site	No  · Has the patient used tobacco in the past 30 days?	No    Risk Assessment:    Present on Admission:  Deep Venous Thrombosis	no  Pulmonary Embolus	no     HIV Screening:  · In accordance with [place] State law, we offer every patient who comes to our ED an HIV test. Would you like to be tested today?	Unable to answer due to medical condition/unresponsive/etc...    Physical Exam:   Physical Exam: Physical Exam  CONSTITUTIONAL: well appearing, NAD  NEURO: A&OX3, no focal deficits                      EYES: PERRLA  ENMT:  neck supple   CV: RRR, no m/r/g  RESPIRATORY: breathing comfortably on RA, no wheezes, rales, rhonchi   GI:  +BS, NT/ND  : No quezada   MUSKULOSKELETAL: No peripheral edema/ calf tenderness   SKIN / BREAST: No rashes noted    Assessment and Plan:   · VTE Risk Assessment	VTE Assessment already completed for this visit  · Completed VTE Risk Assessment(s)	Refer to the Assessment tab to view/cancel completed assessment.     Assessment:  · Assessment	  A/P:    74yo English/ Tagalog speaking M, former smoker PMHx HTN, HLD, BPH, Afib (on Eliquis). Patient was admitted 6/2022 for syncope and subsequently diagnosed with afib. Underwent cardiac cath 5/20/23 that revealed dLM - 60%, ostial/prox LAD 80%, mid LAD 65% (severely calcified), ostial LCX 80%, mid LCX 65%, prox RCA 40%, distal RCA 30%, %  w/left-right collaterals. Dr. Stevenosn consulted for consideration for hybrid procedure, deemed a good candidate for intervention. On 6/15/23 he presented to [facility] for pre operative workup prior to his scheduled procedure.     Neurovascular:   - No delirium. Pain well controlled with current regimen.  -Continue tylenol PRN    Cardiovascular:   - Pre op for MIDCAB tomorrow with plan for PCI later this admission   -Hemodynamically stable. HR controlled (59-76)  - Hx AF: to start heparin gtt, on Eliquis at home   - Hx of HTN, BP controlled (127/86), continue metoprolol 25 q12 hours   - CAD: continue ASA, atorvastatin 40 mg     Respiratory:   - 02 Sat = 98% on RA.  -Encourage C+DB and Use of IS 10x / hr while awake.  -CXR PA/Lat clear, no consolidations     GI:   - Stable.  -NPO after MN for OR tomorrow   -Continue GI PPX with protonix  -PO Diet.    Renal / :  - BUN/Cr stable at 17/0.99  -Continue to monitor renal function.  -Monitor I/O's.  - Replete electrolytes PRN    Endocrine:    -A1c 5.8, no known hx DM  -TSH 1.890, no known hx thyroid disease     Hematologic:  -H/H stable at 13.6/38.6 with no obvious signs of bleeding  - Hep gtt pre op for hx AF  -Coagulation Panel.    ID:  -Pt remains afebrile with no elevation in WBC or signs of infection  -Continue to monitor CBC  -Observe for SIRS/Sepsis Syndrome.    Prophylaxis:  -DVT prophylaxis with heparin gtt   -SCD's    Disposition:  -OR tomorrow           Electronic Signatures:  Mk Stevenson)   (Signed 16-Jun-2023 06:45)  	Co-Signer: History and Physical, History of Present Illness, Allergies/Medications, Patient History, Risk Assessment, Physical Exam, Assessment and Plan  Delores Das)   (Signed 15-Marito-2023 17:36)  	Authored: History and Physical, History of Present Illness, Allergies/Medications, Patient History, Risk Assessment, Physical Exam, Assessment and Plan    Last Updated: 16-Jun-2023 06:45 by Mk Stevenson)

## 2023-06-15 NOTE — H&P ADULT - HISTORY OF PRESENT ILLNESS
72yo English/ Tagalog speaking M, former smoker PMHx HTN, HLD, BPH, Afib (on Eliquis). Patient was admitted 6/2022 for syncope and subsequently diagnosed with afib. Underwent cardiac cath 5/20/23 that revealed dLM - 60%, ostial/prox LAD 80%, mid LAD 65% (severely calcified), ostial LCX 80%, mid LCX 65%, prox RCA 40%, distal RCA 30%, %  w/left-right collaterals. Dr. Stevenson consulted for consideration for hybrid procedure, deemed a good candidate for intervention. On 6/15/23 he presented to [facility] for pre operative workup prior to his scheduled procedure.

## 2023-06-15 NOTE — H&P ADULT - NSHPPHYSICALEXAM_GEN_ALL_CORE
Physical Exam  CONSTITUTIONAL: well appearing, NAD  NEURO: A&OX3, no focal deficits                      EYES: PERRLA  ENMT:  neck supple   CV: RRR, no m/r/g  RESPIRATORY: breathing comfortably on RA, no wheezes, rales, rhonchi   GI:  +BS, NT/ND  : No quezada   MUSKULOSKELETAL: No peripheral edema/ calf tenderness   SKIN / BREAST: No rashes noted

## 2023-06-15 NOTE — H&P ADULT - NSHPSOCIALHISTORY_GEN_ALL_CORE
Social History  Smoker:   Former, quit  20 years ago, 2 PPD previously X 30 years   ETOH Use:   NO     Ilicit Drug Use:   NO  Occupation: N/A  Assistant Devices:   None  Lives with: Daughter

## 2023-06-15 NOTE — H&P ADULT - NSHPREVIEWOFSYSTEMS_GEN_ALL_CORE
Review of Systems  CONSTITUTIONAL:  Denies fevers / chills, sweats, fatigue, weight loss, weight gain                                      NEURO:  Denies paresthesias, seizures, syncope, confusion                                                                                EYES:  Denies blurry vision, discharge, pain, loss of vision                                                                                    ENMT:  Denies difficulty hearing, vertigo, dysphagia, epistaxis, recent dental work                                       CV:  Denies chest pain, palpitations, COATS, orthopnea                                                                                          RESPIRATORY:  Denies wheezing, SOB, cough / sputum, hemoptysis                                                                GI:  Denies nausea, vomiting, diarrhea, constipation, melena, difficulty swallowing                                             : Denies hematuria, dysuria, urgency, incontinence                                                                                         MUSCULOSKELETAL:  Denies arthritis, joint swelling, muscle weakness                                                             SKIN/BREAST:  Denies rash, itching, hair loss, masses                                                                                            PSYCH:  Denies depression, anxiety, suicidal ideation                                                                                               HEME/LYMPH:  Denies bruises easily, enlarged lymph nodes, tender lymph nodes                                        ENDOCRINE:  Denies cold intolerance, heat intolerance, polydipsia

## 2023-06-15 NOTE — PATIENT PROFILE ADULT - FALL HARM RISK - HARM RISK INTERVENTIONS

## 2023-06-16 ENCOUNTER — APPOINTMENT (OUTPATIENT)
Dept: CARDIOTHORACIC SURGERY | Facility: HOSPITAL | Age: 73
End: 2023-06-16

## 2023-06-16 ENCOUNTER — TRANSCRIPTION ENCOUNTER (OUTPATIENT)
Age: 73
End: 2023-06-16

## 2023-06-16 LAB
ALBUMIN SERPL ELPH-MCNC: 3.7 G/DL — SIGNIFICANT CHANGE UP (ref 3.3–5)
ALBUMIN SERPL ELPH-MCNC: 3.8 G/DL — SIGNIFICANT CHANGE UP (ref 3.3–5)
ALP SERPL-CCNC: 74 U/L — SIGNIFICANT CHANGE UP (ref 40–120)
ALP SERPL-CCNC: 88 U/L — SIGNIFICANT CHANGE UP (ref 40–120)
ALT FLD-CCNC: 15 U/L — SIGNIFICANT CHANGE UP (ref 10–45)
ALT FLD-CCNC: <5 U/L — LOW (ref 10–45)
ANION GAP SERPL CALC-SCNC: 11 MMOL/L — SIGNIFICANT CHANGE UP (ref 5–17)
ANION GAP SERPL CALC-SCNC: 8 MMOL/L — SIGNIFICANT CHANGE UP (ref 5–17)
APPEARANCE UR: CLEAR — SIGNIFICANT CHANGE UP
APTT BLD: 25.1 SEC — LOW (ref 27.5–35.5)
APTT BLD: 27.1 SEC — LOW (ref 27.5–35.5)
APTT BLD: 33.2 SEC — SIGNIFICANT CHANGE UP (ref 27.5–35.5)
APTT BLD: 75.5 SEC — HIGH (ref 27.5–35.5)
APTT BLD: 93.7 SEC — HIGH (ref 27.5–35.5)
AST SERPL-CCNC: 25 U/L — SIGNIFICANT CHANGE UP (ref 10–40)
AST SERPL-CCNC: 26 U/L — SIGNIFICANT CHANGE UP (ref 10–40)
BASE EXCESS BLDA CALC-SCNC: -0.1 MMOL/L — SIGNIFICANT CHANGE UP (ref -2–3)
BASE EXCESS BLDA CALC-SCNC: -0.3 MMOL/L — SIGNIFICANT CHANGE UP (ref -2–3)
BASE EXCESS BLDA CALC-SCNC: -1.3 MMOL/L — SIGNIFICANT CHANGE UP (ref -2–3)
BASE EXCESS BLDA CALC-SCNC: 1.4 MMOL/L — SIGNIFICANT CHANGE UP (ref -2–3)
BASOPHILS # BLD AUTO: 0.01 K/UL — SIGNIFICANT CHANGE UP (ref 0–0.2)
BASOPHILS # BLD AUTO: 0.01 K/UL — SIGNIFICANT CHANGE UP (ref 0–0.2)
BASOPHILS NFR BLD AUTO: 0.1 % — SIGNIFICANT CHANGE UP (ref 0–2)
BASOPHILS NFR BLD AUTO: 0.1 % — SIGNIFICANT CHANGE UP (ref 0–2)
BILIRUB SERPL-MCNC: 0.4 MG/DL — SIGNIFICANT CHANGE UP (ref 0.2–1.2)
BILIRUB SERPL-MCNC: 0.6 MG/DL — SIGNIFICANT CHANGE UP (ref 0.2–1.2)
BILIRUB UR-MCNC: NEGATIVE — SIGNIFICANT CHANGE UP
BUN SERPL-MCNC: 12 MG/DL — SIGNIFICANT CHANGE UP (ref 7–23)
BUN SERPL-MCNC: 12 MG/DL — SIGNIFICANT CHANGE UP (ref 7–23)
BUN SERPL-MCNC: 13 MG/DL — SIGNIFICANT CHANGE UP (ref 7–23)
BUN SERPL-MCNC: 14 MG/DL — SIGNIFICANT CHANGE UP (ref 7–23)
CA-I BLDA-SCNC: 1.08 MMOL/L — LOW (ref 1.15–1.33)
CA-I BLDA-SCNC: 1.09 MMOL/L — LOW (ref 1.15–1.33)
CA-I BLDA-SCNC: 1.09 MMOL/L — LOW (ref 1.15–1.33)
CA-I BLDA-SCNC: 1.13 MMOL/L — LOW (ref 1.15–1.33)
CALCIUM SERPL-MCNC: 7.7 MG/DL — LOW (ref 8.4–10.5)
CALCIUM SERPL-MCNC: 8.3 MG/DL — LOW (ref 8.4–10.5)
CALCIUM SERPL-MCNC: 8.6 MG/DL — SIGNIFICANT CHANGE UP (ref 8.4–10.5)
CALCIUM SERPL-MCNC: 8.7 MG/DL — SIGNIFICANT CHANGE UP (ref 8.4–10.5)
CHLORIDE SERPL-SCNC: 101 MMOL/L — SIGNIFICANT CHANGE UP (ref 96–108)
CHLORIDE SERPL-SCNC: 102 MMOL/L — SIGNIFICANT CHANGE UP (ref 96–108)
CHLORIDE SERPL-SCNC: 104 MMOL/L — SIGNIFICANT CHANGE UP (ref 96–108)
CHLORIDE SERPL-SCNC: 106 MMOL/L — SIGNIFICANT CHANGE UP (ref 96–108)
CO2 BLDA-SCNC: 24 MMOL/L — SIGNIFICANT CHANGE UP (ref 19–24)
CO2 BLDA-SCNC: 25 MMOL/L — HIGH (ref 19–24)
CO2 BLDA-SCNC: 25 MMOL/L — HIGH (ref 19–24)
CO2 BLDA-SCNC: 28 MMOL/L — HIGH (ref 19–24)
CO2 SERPL-SCNC: 22 MMOL/L — SIGNIFICANT CHANGE UP (ref 22–31)
CO2 SERPL-SCNC: 22 MMOL/L — SIGNIFICANT CHANGE UP (ref 22–31)
CO2 SERPL-SCNC: 25 MMOL/L — SIGNIFICANT CHANGE UP (ref 22–31)
CO2 SERPL-SCNC: 27 MMOL/L — SIGNIFICANT CHANGE UP (ref 22–31)
COHGB MFR BLDA: 0.9 % — SIGNIFICANT CHANGE UP
COHGB MFR BLDA: 1.2 % — SIGNIFICANT CHANGE UP
COHGB MFR BLDA: 1.3 % — SIGNIFICANT CHANGE UP
COHGB MFR BLDA: 1.5 % — SIGNIFICANT CHANGE UP
COLOR SPEC: YELLOW — SIGNIFICANT CHANGE UP
CREAT SERPL-MCNC: 0.79 MG/DL — SIGNIFICANT CHANGE UP (ref 0.5–1.3)
CREAT SERPL-MCNC: 0.87 MG/DL — SIGNIFICANT CHANGE UP (ref 0.5–1.3)
CREAT SERPL-MCNC: 0.9 MG/DL — SIGNIFICANT CHANGE UP (ref 0.5–1.3)
CREAT SERPL-MCNC: 0.93 MG/DL — SIGNIFICANT CHANGE UP (ref 0.5–1.3)
DIFF PNL FLD: NEGATIVE — SIGNIFICANT CHANGE UP
EGFR: 86 ML/MIN/1.73M2 — SIGNIFICANT CHANGE UP
EGFR: 90 ML/MIN/1.73M2 — SIGNIFICANT CHANGE UP
EGFR: 91 ML/MIN/1.73M2 — SIGNIFICANT CHANGE UP
EGFR: 93 ML/MIN/1.73M2 — SIGNIFICANT CHANGE UP
EOSINOPHIL # BLD AUTO: 0 K/UL — SIGNIFICANT CHANGE UP (ref 0–0.5)
EOSINOPHIL # BLD AUTO: 0 K/UL — SIGNIFICANT CHANGE UP (ref 0–0.5)
EOSINOPHIL NFR BLD AUTO: 0 % — SIGNIFICANT CHANGE UP (ref 0–6)
EOSINOPHIL NFR BLD AUTO: 0 % — SIGNIFICANT CHANGE UP (ref 0–6)
GAS PNL BLDA: SIGNIFICANT CHANGE UP
GLUCOSE BLDA-MCNC: 113 MG/DL — HIGH (ref 70–99)
GLUCOSE BLDA-MCNC: 148 MG/DL — HIGH (ref 70–99)
GLUCOSE BLDA-MCNC: 156 MG/DL — HIGH (ref 70–99)
GLUCOSE BLDA-MCNC: 160 MG/DL — HIGH (ref 70–99)
GLUCOSE BLDC GLUCOMTR-MCNC: 102 MG/DL — HIGH (ref 70–99)
GLUCOSE BLDC GLUCOMTR-MCNC: 117 MG/DL — HIGH (ref 70–99)
GLUCOSE BLDC GLUCOMTR-MCNC: 120 MG/DL — HIGH (ref 70–99)
GLUCOSE BLDC GLUCOMTR-MCNC: 146 MG/DL — HIGH (ref 70–99)
GLUCOSE BLDC GLUCOMTR-MCNC: 155 MG/DL — HIGH (ref 70–99)
GLUCOSE BLDC GLUCOMTR-MCNC: 165 MG/DL — HIGH (ref 70–99)
GLUCOSE SERPL-MCNC: 101 MG/DL — HIGH (ref 70–99)
GLUCOSE SERPL-MCNC: 119 MG/DL — HIGH (ref 70–99)
GLUCOSE SERPL-MCNC: 161 MG/DL — HIGH (ref 70–99)
GLUCOSE SERPL-MCNC: 163 MG/DL — HIGH (ref 70–99)
GLUCOSE UR QL: NEGATIVE — SIGNIFICANT CHANGE UP
HCO3 BLDA-SCNC: 23 MMOL/L — SIGNIFICANT CHANGE UP (ref 21–28)
HCO3 BLDA-SCNC: 24 MMOL/L — SIGNIFICANT CHANGE UP (ref 21–28)
HCO3 BLDA-SCNC: 24 MMOL/L — SIGNIFICANT CHANGE UP (ref 21–28)
HCO3 BLDA-SCNC: 27 MMOL/L — SIGNIFICANT CHANGE UP (ref 21–28)
HCT VFR BLD CALC: 36.7 % — LOW (ref 39–50)
HCT VFR BLD CALC: 39.1 % — SIGNIFICANT CHANGE UP (ref 39–50)
HCT VFR BLD CALC: 39.9 % — SIGNIFICANT CHANGE UP (ref 39–50)
HCT VFR BLD CALC: 42.5 % — SIGNIFICANT CHANGE UP (ref 39–50)
HCV AB S/CO SERPL IA: 0.04 S/CO — SIGNIFICANT CHANGE UP
HCV AB SERPL-IMP: SIGNIFICANT CHANGE UP
HGB BLD-MCNC: 12.7 G/DL — LOW (ref 13–17)
HGB BLD-MCNC: 13.4 G/DL — SIGNIFICANT CHANGE UP (ref 13–17)
HGB BLD-MCNC: 13.8 G/DL — SIGNIFICANT CHANGE UP (ref 13–17)
HGB BLD-MCNC: 14.4 G/DL — SIGNIFICANT CHANGE UP (ref 13–17)
HGB BLDA-MCNC: 13.2 G/DL — SIGNIFICANT CHANGE UP (ref 12.6–17.4)
HGB BLDA-MCNC: 13.7 G/DL — SIGNIFICANT CHANGE UP (ref 12.6–17.4)
HGB BLDA-MCNC: 13.9 G/DL — SIGNIFICANT CHANGE UP (ref 12.6–17.4)
HGB BLDA-MCNC: 14.1 G/DL — SIGNIFICANT CHANGE UP (ref 12.6–17.4)
IMM GRANULOCYTES NFR BLD AUTO: 0.6 % — SIGNIFICANT CHANGE UP (ref 0–0.9)
IMM GRANULOCYTES NFR BLD AUTO: 0.7 % — SIGNIFICANT CHANGE UP (ref 0–0.9)
INR BLD: 1.06 — SIGNIFICANT CHANGE UP (ref 0.88–1.16)
INR BLD: 1.13 — SIGNIFICANT CHANGE UP (ref 0.88–1.16)
INR BLD: 1.14 — SIGNIFICANT CHANGE UP (ref 0.88–1.16)
KETONES UR-MCNC: NEGATIVE — SIGNIFICANT CHANGE UP
LEUKOCYTE ESTERASE UR-ACNC: NEGATIVE — SIGNIFICANT CHANGE UP
LYMPHOCYTES # BLD AUTO: 0.64 K/UL — LOW (ref 1–3.3)
LYMPHOCYTES # BLD AUTO: 1.04 K/UL — SIGNIFICANT CHANGE UP (ref 1–3.3)
LYMPHOCYTES # BLD AUTO: 3.7 % — LOW (ref 13–44)
LYMPHOCYTES # BLD AUTO: 6.8 % — LOW (ref 13–44)
MAGNESIUM SERPL-MCNC: 1.9 MG/DL — SIGNIFICANT CHANGE UP (ref 1.6–2.6)
MAGNESIUM SERPL-MCNC: 2.3 MG/DL — SIGNIFICANT CHANGE UP (ref 1.6–2.6)
MAGNESIUM SERPL-MCNC: 2.5 MG/DL — SIGNIFICANT CHANGE UP (ref 1.6–2.6)
MCHC RBC-ENTMCNC: 31.9 PG — SIGNIFICANT CHANGE UP (ref 27–34)
MCHC RBC-ENTMCNC: 32.4 PG — SIGNIFICANT CHANGE UP (ref 27–34)
MCHC RBC-ENTMCNC: 32.4 PG — SIGNIFICANT CHANGE UP (ref 27–34)
MCHC RBC-ENTMCNC: 32.5 PG — SIGNIFICANT CHANGE UP (ref 27–34)
MCHC RBC-ENTMCNC: 33.9 GM/DL — SIGNIFICANT CHANGE UP (ref 32–36)
MCHC RBC-ENTMCNC: 34.3 GM/DL — SIGNIFICANT CHANGE UP (ref 32–36)
MCHC RBC-ENTMCNC: 34.6 GM/DL — SIGNIFICANT CHANGE UP (ref 32–36)
MCHC RBC-ENTMCNC: 34.6 GM/DL — SIGNIFICANT CHANGE UP (ref 32–36)
MCV RBC AUTO: 93.1 FL — SIGNIFICANT CHANGE UP (ref 80–100)
MCV RBC AUTO: 93.7 FL — SIGNIFICANT CHANGE UP (ref 80–100)
MCV RBC AUTO: 93.9 FL — SIGNIFICANT CHANGE UP (ref 80–100)
MCV RBC AUTO: 95.7 FL — SIGNIFICANT CHANGE UP (ref 80–100)
METHGB MFR BLDA: 1 % — SIGNIFICANT CHANGE UP
METHGB MFR BLDA: 1.1 % — SIGNIFICANT CHANGE UP
METHGB MFR BLDA: 1.1 % — SIGNIFICANT CHANGE UP
METHGB MFR BLDA: 1.4 % — SIGNIFICANT CHANGE UP
MONOCYTES # BLD AUTO: 0.39 K/UL — SIGNIFICANT CHANGE UP (ref 0–0.9)
MONOCYTES # BLD AUTO: 0.86 K/UL — SIGNIFICANT CHANGE UP (ref 0–0.9)
MONOCYTES NFR BLD AUTO: 2.2 % — SIGNIFICANT CHANGE UP (ref 2–14)
MONOCYTES NFR BLD AUTO: 5.6 % — SIGNIFICANT CHANGE UP (ref 2–14)
NEUTROPHILS # BLD AUTO: 13.28 K/UL — HIGH (ref 1.8–7.4)
NEUTROPHILS # BLD AUTO: 16.24 K/UL — HIGH (ref 1.8–7.4)
NEUTROPHILS NFR BLD AUTO: 86.8 % — HIGH (ref 43–77)
NEUTROPHILS NFR BLD AUTO: 93.4 % — HIGH (ref 43–77)
NITRITE UR-MCNC: NEGATIVE — SIGNIFICANT CHANGE UP
NRBC # BLD: 0 /100 WBCS — SIGNIFICANT CHANGE UP (ref 0–0)
OXYHGB MFR BLDA: 96.6 % — HIGH (ref 90–95)
OXYHGB MFR BLDA: 97.1 % — HIGH (ref 90–95)
OXYHGB MFR BLDA: 97.5 % — HIGH (ref 90–95)
OXYHGB MFR BLDA: 97.5 % — HIGH (ref 90–95)
PCO2 BLDA: 35 MMHG — SIGNIFICANT CHANGE UP (ref 35–48)
PCO2 BLDA: 36 MMHG — SIGNIFICANT CHANGE UP (ref 35–48)
PCO2 BLDA: 38 MMHG — SIGNIFICANT CHANGE UP (ref 35–48)
PCO2 BLDA: 43 MMHG — SIGNIFICANT CHANGE UP (ref 35–48)
PH BLDA: 7.4 — SIGNIFICANT CHANGE UP (ref 7.35–7.45)
PH BLDA: 7.41 — SIGNIFICANT CHANGE UP (ref 7.35–7.45)
PH BLDA: 7.42 — SIGNIFICANT CHANGE UP (ref 7.35–7.45)
PH BLDA: 7.43 — SIGNIFICANT CHANGE UP (ref 7.35–7.45)
PH UR: 6.5 — SIGNIFICANT CHANGE UP (ref 5–8)
PHOSPHATE SERPL-MCNC: 2.1 MG/DL — LOW (ref 2.5–4.5)
PHOSPHATE SERPL-MCNC: 2.3 MG/DL — LOW (ref 2.5–4.5)
PHOSPHATE SERPL-MCNC: 2.9 MG/DL — SIGNIFICANT CHANGE UP (ref 2.5–4.5)
PLATELET # BLD AUTO: 202 K/UL — SIGNIFICANT CHANGE UP (ref 150–400)
PLATELET # BLD AUTO: 217 K/UL — SIGNIFICANT CHANGE UP (ref 150–400)
PLATELET # BLD AUTO: 218 K/UL — SIGNIFICANT CHANGE UP (ref 150–400)
PLATELET # BLD AUTO: 223 K/UL — SIGNIFICANT CHANGE UP (ref 150–400)
PO2 BLDA: 124 MMHG — HIGH (ref 83–108)
PO2 BLDA: 158 MMHG — HIGH (ref 83–108)
PO2 BLDA: 217 MMHG — HIGH (ref 83–108)
PO2 BLDA: 379 MMHG — HIGH (ref 83–108)
POTASSIUM BLDA-SCNC: 3.7 MMOL/L — SIGNIFICANT CHANGE UP (ref 3.5–5.1)
POTASSIUM BLDA-SCNC: 3.8 MMOL/L — SIGNIFICANT CHANGE UP (ref 3.5–5.1)
POTASSIUM BLDA-SCNC: 4.1 MMOL/L — SIGNIFICANT CHANGE UP (ref 3.5–5.1)
POTASSIUM BLDA-SCNC: 4.2 MMOL/L — SIGNIFICANT CHANGE UP (ref 3.5–5.1)
POTASSIUM SERPL-MCNC: 3.8 MMOL/L — SIGNIFICANT CHANGE UP (ref 3.5–5.3)
POTASSIUM SERPL-MCNC: 3.8 MMOL/L — SIGNIFICANT CHANGE UP (ref 3.5–5.3)
POTASSIUM SERPL-MCNC: 4 MMOL/L — SIGNIFICANT CHANGE UP (ref 3.5–5.3)
POTASSIUM SERPL-MCNC: 4.2 MMOL/L — SIGNIFICANT CHANGE UP (ref 3.5–5.3)
POTASSIUM SERPL-SCNC: 3.8 MMOL/L — SIGNIFICANT CHANGE UP (ref 3.5–5.3)
POTASSIUM SERPL-SCNC: 3.8 MMOL/L — SIGNIFICANT CHANGE UP (ref 3.5–5.3)
POTASSIUM SERPL-SCNC: 4 MMOL/L — SIGNIFICANT CHANGE UP (ref 3.5–5.3)
POTASSIUM SERPL-SCNC: 4.2 MMOL/L — SIGNIFICANT CHANGE UP (ref 3.5–5.3)
PROT SERPL-MCNC: 6.9 G/DL — SIGNIFICANT CHANGE UP (ref 6–8.3)
PROT SERPL-MCNC: 7.2 G/DL — SIGNIFICANT CHANGE UP (ref 6–8.3)
PROT UR-MCNC: NEGATIVE MG/DL — SIGNIFICANT CHANGE UP
PROTHROM AB SERPL-ACNC: 12.6 SEC — SIGNIFICANT CHANGE UP (ref 10.5–13.4)
PROTHROM AB SERPL-ACNC: 13.5 SEC — HIGH (ref 10.5–13.4)
PROTHROM AB SERPL-ACNC: 13.6 SEC — HIGH (ref 10.5–13.4)
RBC # BLD: 3.91 M/UL — LOW (ref 4.2–5.8)
RBC # BLD: 4.2 M/UL — SIGNIFICANT CHANGE UP (ref 4.2–5.8)
RBC # BLD: 4.26 M/UL — SIGNIFICANT CHANGE UP (ref 4.2–5.8)
RBC # BLD: 4.44 M/UL — SIGNIFICANT CHANGE UP (ref 4.2–5.8)
RBC # FLD: 12 % — SIGNIFICANT CHANGE UP (ref 10.3–14.5)
RBC # FLD: 12 % — SIGNIFICANT CHANGE UP (ref 10.3–14.5)
RBC # FLD: 12.1 % — SIGNIFICANT CHANGE UP (ref 10.3–14.5)
RBC # FLD: 12.2 % — SIGNIFICANT CHANGE UP (ref 10.3–14.5)
SAO2 % BLDA: 100 % — HIGH (ref 94–98)
SAO2 % BLDA: 98.9 % — HIGH (ref 94–98)
SAO2 % BLDA: 99.4 % — HIGH (ref 94–98)
SAO2 % BLDA: 99.8 % — HIGH (ref 94–98)
SODIUM BLDA-SCNC: 135 MMOL/L — LOW (ref 136–145)
SODIUM BLDA-SCNC: 137 MMOL/L — SIGNIFICANT CHANGE UP (ref 136–145)
SODIUM SERPL-SCNC: 134 MMOL/L — LOW (ref 135–145)
SODIUM SERPL-SCNC: 137 MMOL/L — SIGNIFICANT CHANGE UP (ref 135–145)
SODIUM SERPL-SCNC: 139 MMOL/L — SIGNIFICANT CHANGE UP (ref 135–145)
SODIUM SERPL-SCNC: 140 MMOL/L — SIGNIFICANT CHANGE UP (ref 135–145)
SP GR SPEC: 1.01 — SIGNIFICANT CHANGE UP (ref 1–1.03)
UROBILINOGEN FLD QL: 1 E.U./DL — SIGNIFICANT CHANGE UP
WBC # BLD: 11.11 K/UL — HIGH (ref 3.8–10.5)
WBC # BLD: 15.3 K/UL — HIGH (ref 3.8–10.5)
WBC # BLD: 17.39 K/UL — HIGH (ref 3.8–10.5)
WBC # BLD: 23.07 K/UL — HIGH (ref 3.8–10.5)
WBC # FLD AUTO: 11.11 K/UL — HIGH (ref 3.8–10.5)
WBC # FLD AUTO: 15.3 K/UL — HIGH (ref 3.8–10.5)
WBC # FLD AUTO: 17.39 K/UL — HIGH (ref 3.8–10.5)
WBC # FLD AUTO: 23.07 K/UL — HIGH (ref 3.8–10.5)

## 2023-06-16 PROCEDURE — 33533 CABG ARTERIAL SINGLE: CPT

## 2023-06-16 PROCEDURE — 93010 ELECTROCARDIOGRAM REPORT: CPT

## 2023-06-16 PROCEDURE — 71045 X-RAY EXAM CHEST 1 VIEW: CPT | Mod: 26

## 2023-06-16 DEVICE — LUKENS BONE WAX 2.5G: Type: IMPLANTABLE DEVICE | Status: FUNCTIONAL

## 2023-06-16 DEVICE — KIT CVC 3LUM SPECTRUM 9FR: Type: IMPLANTABLE DEVICE | Status: FUNCTIONAL

## 2023-06-16 DEVICE — TACHOSIL 9.5 X 4.8CM: Type: IMPLANTABLE DEVICE | Status: FUNCTIONAL

## 2023-06-16 DEVICE — SHUNT FLO-THRU INTRALUMINAL1.75MM X 18MM: Type: IMPLANTABLE DEVICE | Status: FUNCTIONAL

## 2023-06-16 DEVICE — CHEST DRAIN THORACIC PVC 28FR RIGHT ANGLE: Type: IMPLANTABLE DEVICE | Status: FUNCTIONAL

## 2023-06-16 RX ORDER — ALBUMIN HUMAN 25 %
250 VIAL (ML) INTRAVENOUS ONCE
Refills: 0 | Status: COMPLETED | OUTPATIENT
Start: 2023-06-16 | End: 2023-06-16

## 2023-06-16 RX ORDER — DEXTROSE 50 % IN WATER 50 %
25 SYRINGE (ML) INTRAVENOUS ONCE
Refills: 0 | Status: DISCONTINUED | OUTPATIENT
Start: 2023-06-16 | End: 2023-06-20

## 2023-06-16 RX ORDER — GLUCAGON INJECTION, SOLUTION 0.5 MG/.1ML
1 INJECTION, SOLUTION SUBCUTANEOUS ONCE
Refills: 0 | Status: DISCONTINUED | OUTPATIENT
Start: 2023-06-16 | End: 2023-06-20

## 2023-06-16 RX ORDER — PANTOPRAZOLE SODIUM 20 MG/1
40 TABLET, DELAYED RELEASE ORAL DAILY
Refills: 0 | Status: DISCONTINUED | OUTPATIENT
Start: 2023-06-16 | End: 2023-06-20

## 2023-06-16 RX ORDER — CHLORHEXIDINE GLUCONATE 213 G/1000ML
15 SOLUTION TOPICAL EVERY 12 HOURS
Refills: 0 | Status: DISCONTINUED | OUTPATIENT
Start: 2023-06-16 | End: 2023-06-16

## 2023-06-16 RX ORDER — ATORVASTATIN CALCIUM 80 MG/1
40 TABLET, FILM COATED ORAL AT BEDTIME
Refills: 0 | Status: DISCONTINUED | OUTPATIENT
Start: 2023-06-16 | End: 2023-06-20

## 2023-06-16 RX ORDER — SODIUM CHLORIDE 9 MG/ML
1000 INJECTION, SOLUTION INTRAVENOUS
Refills: 0 | Status: DISCONTINUED | OUTPATIENT
Start: 2023-06-16 | End: 2023-06-20

## 2023-06-16 RX ORDER — CEFAZOLIN SODIUM 1 G
2000 VIAL (EA) INJECTION EVERY 8 HOURS
Refills: 0 | Status: COMPLETED | OUTPATIENT
Start: 2023-06-16 | End: 2023-06-18

## 2023-06-16 RX ORDER — DEXTROSE 50 % IN WATER 50 %
50 SYRINGE (ML) INTRAVENOUS
Refills: 0 | Status: DISCONTINUED | OUTPATIENT
Start: 2023-06-16 | End: 2023-06-16

## 2023-06-16 RX ORDER — TAMSULOSIN HYDROCHLORIDE 0.4 MG/1
0.4 CAPSULE ORAL AT BEDTIME
Refills: 0 | Status: DISCONTINUED | OUTPATIENT
Start: 2023-06-16 | End: 2023-06-20

## 2023-06-16 RX ORDER — SODIUM CHLORIDE 9 MG/ML
1000 INJECTION INTRAMUSCULAR; INTRAVENOUS; SUBCUTANEOUS
Refills: 0 | Status: DISCONTINUED | OUTPATIENT
Start: 2023-06-16 | End: 2023-06-17

## 2023-06-16 RX ORDER — ACETAMINOPHEN 500 MG
650 TABLET ORAL EVERY 6 HOURS
Refills: 0 | Status: DISCONTINUED | OUTPATIENT
Start: 2023-06-16 | End: 2023-06-20

## 2023-06-16 RX ORDER — CHLORHEXIDINE GLUCONATE 213 G/1000ML
1 SOLUTION TOPICAL DAILY
Refills: 0 | Status: DISCONTINUED | OUTPATIENT
Start: 2023-06-16 | End: 2023-06-20

## 2023-06-16 RX ORDER — METOPROLOL TARTRATE 50 MG
12.5 TABLET ORAL EVERY 12 HOURS
Refills: 0 | Status: DISCONTINUED | OUTPATIENT
Start: 2023-06-16 | End: 2023-06-18

## 2023-06-16 RX ORDER — DEXTROSE 50 % IN WATER 50 %
12.5 SYRINGE (ML) INTRAVENOUS ONCE
Refills: 0 | Status: DISCONTINUED | OUTPATIENT
Start: 2023-06-16 | End: 2023-06-20

## 2023-06-16 RX ORDER — INSULIN HUMAN 100 [IU]/ML
1 INJECTION, SOLUTION SUBCUTANEOUS
Qty: 50 | Refills: 0 | Status: DISCONTINUED | OUTPATIENT
Start: 2023-06-16 | End: 2023-06-16

## 2023-06-16 RX ORDER — SENNA PLUS 8.6 MG/1
2 TABLET ORAL AT BEDTIME
Refills: 0 | Status: DISCONTINUED | OUTPATIENT
Start: 2023-06-17 | End: 2023-06-20

## 2023-06-16 RX ORDER — POLYETHYLENE GLYCOL 3350 17 G/17G
17 POWDER, FOR SOLUTION ORAL DAILY
Refills: 0 | Status: DISCONTINUED | OUTPATIENT
Start: 2023-06-17 | End: 2023-06-20

## 2023-06-16 RX ORDER — MAGNESIUM SULFATE 500 MG/ML
2 VIAL (ML) INJECTION ONCE
Refills: 0 | Status: COMPLETED | OUTPATIENT
Start: 2023-06-16 | End: 2023-06-16

## 2023-06-16 RX ORDER — DEXTROSE 50 % IN WATER 50 %
25 SYRINGE (ML) INTRAVENOUS
Refills: 0 | Status: DISCONTINUED | OUTPATIENT
Start: 2023-06-16 | End: 2023-06-16

## 2023-06-16 RX ORDER — ASPIRIN/CALCIUM CARB/MAGNESIUM 324 MG
81 TABLET ORAL DAILY
Refills: 0 | Status: DISCONTINUED | OUTPATIENT
Start: 2023-06-16 | End: 2023-06-20

## 2023-06-16 RX ORDER — NICARDIPINE HYDROCHLORIDE 30 MG/1
2.5 CAPSULE, EXTENDED RELEASE ORAL
Qty: 40 | Refills: 0 | Status: DISCONTINUED | OUTPATIENT
Start: 2023-06-16 | End: 2023-06-17

## 2023-06-16 RX ORDER — OXYCODONE HYDROCHLORIDE 5 MG/1
10 TABLET ORAL EVERY 6 HOURS
Refills: 0 | Status: DISCONTINUED | OUTPATIENT
Start: 2023-06-16 | End: 2023-06-20

## 2023-06-16 RX ORDER — ACETAMINOPHEN 500 MG
1000 TABLET ORAL ONCE
Refills: 0 | Status: COMPLETED | OUTPATIENT
Start: 2023-06-16 | End: 2023-06-16

## 2023-06-16 RX ORDER — OXYCODONE HYDROCHLORIDE 5 MG/1
5 TABLET ORAL EVERY 4 HOURS
Refills: 0 | Status: DISCONTINUED | OUTPATIENT
Start: 2023-06-16 | End: 2023-06-20

## 2023-06-16 RX ORDER — INSULIN LISPRO 100/ML
VIAL (ML) SUBCUTANEOUS
Refills: 0 | Status: DISCONTINUED | OUTPATIENT
Start: 2023-06-16 | End: 2023-06-20

## 2023-06-16 RX ORDER — CLOPIDOGREL BISULFATE 75 MG/1
75 TABLET, FILM COATED ORAL DAILY
Refills: 0 | Status: DISCONTINUED | OUTPATIENT
Start: 2023-06-16 | End: 2023-06-20

## 2023-06-16 RX ORDER — HEPARIN SODIUM 5000 [USP'U]/ML
5000 INJECTION INTRAVENOUS; SUBCUTANEOUS EVERY 8 HOURS
Refills: 0 | Status: DISCONTINUED | OUTPATIENT
Start: 2023-06-16 | End: 2023-06-20

## 2023-06-16 RX ORDER — DEXTROSE 50 % IN WATER 50 %
15 SYRINGE (ML) INTRAVENOUS ONCE
Refills: 0 | Status: DISCONTINUED | OUTPATIENT
Start: 2023-06-16 | End: 2023-06-20

## 2023-06-16 RX ORDER — POTASSIUM CHLORIDE 20 MEQ
20 PACKET (EA) ORAL ONCE
Refills: 0 | Status: COMPLETED | OUTPATIENT
Start: 2023-06-16 | End: 2023-06-16

## 2023-06-16 RX ADMIN — Medication 62.5 MILLIMOLE(S): at 19:00

## 2023-06-16 RX ADMIN — CLOPIDOGREL BISULFATE 75 MILLIGRAM(S): 75 TABLET, FILM COATED ORAL at 18:48

## 2023-06-16 RX ADMIN — Medication 81 MILLIGRAM(S): at 18:48

## 2023-06-16 RX ADMIN — ATORVASTATIN CALCIUM 40 MILLIGRAM(S): 80 TABLET, FILM COATED ORAL at 22:02

## 2023-06-16 RX ADMIN — CHLORHEXIDINE GLUCONATE 1 APPLICATION(S): 213 SOLUTION TOPICAL at 05:25

## 2023-06-16 RX ADMIN — OXYCODONE HYDROCHLORIDE 5 MILLIGRAM(S): 5 TABLET ORAL at 22:33

## 2023-06-16 RX ADMIN — Medication 100 MILLIEQUIVALENT(S): at 13:38

## 2023-06-16 RX ADMIN — Medication 1000 MILLIGRAM(S): at 19:17

## 2023-06-16 RX ADMIN — PANTOPRAZOLE SODIUM 40 MILLIGRAM(S): 20 TABLET, DELAYED RELEASE ORAL at 18:48

## 2023-06-16 RX ADMIN — PANTOPRAZOLE SODIUM 40 MILLIGRAM(S): 20 TABLET, DELAYED RELEASE ORAL at 05:09

## 2023-06-16 RX ADMIN — TAMSULOSIN HYDROCHLORIDE 0.4 MILLIGRAM(S): 0.4 CAPSULE ORAL at 22:02

## 2023-06-16 RX ADMIN — Medication 150 GRAM(S): at 13:38

## 2023-06-16 RX ADMIN — HEPARIN SODIUM 5000 UNIT(S): 5000 INJECTION INTRAVENOUS; SUBCUTANEOUS at 22:02

## 2023-06-16 RX ADMIN — CHLORHEXIDINE GLUCONATE 10 MILLILITER(S): 213 SOLUTION TOPICAL at 05:10

## 2023-06-16 RX ADMIN — INSULIN HUMAN 1 UNIT(S)/HR: 100 INJECTION, SOLUTION SUBCUTANEOUS at 14:24

## 2023-06-16 RX ADMIN — SODIUM CHLORIDE 3 MILLILITER(S): 9 INJECTION INTRAMUSCULAR; INTRAVENOUS; SUBCUTANEOUS at 05:16

## 2023-06-16 RX ADMIN — Medication 12.5 MILLIGRAM(S): at 19:43

## 2023-06-16 RX ADMIN — OXYCODONE HYDROCHLORIDE 10 MILLIGRAM(S): 5 TABLET ORAL at 18:48

## 2023-06-16 RX ADMIN — Medication 400 MILLIGRAM(S): at 16:34

## 2023-06-16 RX ADMIN — Medication 100 MILLIGRAM(S): at 18:47

## 2023-06-16 RX ADMIN — OXYCODONE HYDROCHLORIDE 10 MILLIGRAM(S): 5 TABLET ORAL at 19:19

## 2023-06-16 RX ADMIN — Medication 2: at 22:02

## 2023-06-16 RX ADMIN — Medication 125 MILLILITER(S): at 12:35

## 2023-06-16 RX ADMIN — OXYCODONE HYDROCHLORIDE 5 MILLIGRAM(S): 5 TABLET ORAL at 22:03

## 2023-06-16 NOTE — PROGRESS NOTE ADULT - SUBJECTIVE AND OBJECTIVE BOX
INTERVAL HPI/OVERNIGHT EVENTS:    OpDay: [facility] J[facility] Par[facility] - awaiting completion of hybrid procedure  EF normla    74yo English/ Tagalog speaking Male Hx HTN, HLD, BPH, Afib (Eliquis) admitted with syncope 6/2022 found to have Fib    Cath 5/20: dLM - 60%, ostial/prox LAD 80%, mid LAD 65% (severely calcified), ostial LCX 80%, mid LCX 65%, prox RCA 40%, distal RCA 30%, %  w/left-right collaterals.     to OR today as part of planned hybrid procedure  no intraop blood given; arrived to ICU extubated and on no infusions    hypertensive on arrival - cardene started       ICU Vital Signs Last 24 Hrs  T(C): 36.2 (16 Jun 2023 06:19), Max: 36.7 (15 Marito 2023 21:00)  T(F): 97.2 (16 Jun 2023 06:19), Max: 98 (15 Marito 2023 21:00)  HR: 87 (16 Jun 2023 12:45) (40 - 87) sinus   BP: 112/64 (16 Jun 2023 12:45) (112/64 - 142/83)  BP(mean): 83 (16 Jun 2023 12:45) (83 - 102)  ABP: 164/69 (16 Jun 2023 12:45) (164/69 - 178/88)  ABP(mean): 93 (16 Jun 2023 12:45) (93 - 118)  RR: 18 (16 Jun 2023 12:45) (17 - 18)  SpO2: 97% (16 Jun 2023 12:45) (96% - 99%) 3L NC     Qtts:  cardene     I&O's Summary    15 Marito 2023 07:01  -  16 Jun 2023 07:00  --------------------------------------------------------  IN: 0 mL / OUT: 400 mL / NET: -400 mL    16 Jun 2023 07:01  -  16 Jun 2023 13:01  --------------------------------------------------------  IN: 0 mL / OUT: 15 mL / NET: -15 mL    Physical Exam    Heart - regular (-)rub/gallop  Lungs - CTA anterior - no rhonchi/wheeze  Abd - (+) soft NTND (-)r/r/g  Ext - warm to touch; no cyanosis/clubbing  Chest - gab in place   Neuro - alert/oriented and interactive; moving all extremities  Skin - no rash    LABS:                        13.8   23.07 )-----------( 217      ( 16 Jun 2023 12:31 )             39.9     06-16    140  |  102  |  14  ----------------------------<  101<H>  3.8   |  27  |  0.90    Ca    8.7      16 Jun 2023 05:30  Mg     2.1     06-15    TPro  7.2  /  Alb  3.9  /  TBili  0.4  /  DBili  x   /  AST  21  /  ALT  18  /  AlkPhos  121<H>  06-15    PT/INR - ( 16 Jun 2023 05:30 )   PT: 12.6 sec;   INR: 1.06     PTT - ( 16 Jun 2023 05:30 )  PTT:93.7 sec    ABG - ( 16 Jun 2023 12:31 )  pH, Arterial: 7.34  pH, Blood: x     /  pCO2: 46    /  pO2: 98    / HCO3: 25    / Base Excess: -1.3  /  SaO2: 98.1      RADIOLOGY & ADDITIONAL STUDIES: reviewed     Patient with syncope/atrial fib found on assessment to have CAD - now s/p RobMidCAB as part pf planned hybrid procedure - timing of PCI to be determined     1. CV  cardene started for hypertensive - anticipate transition of BB in post-op course when appropriate  sinus rhythm at this time; noted Hx Fib on AC  ASA/Plavix/statin  complete periop Abx prophylaxis   planned completion PCI per CTS    2. Pulm   monitor chest tube output  nasal cannula - titrate as clinical scenario allows   eventual incentive spirometry and OOB    glycemic control  DVT and GI prophylaxis    d/w anesthesia/staff and CTS    I have spent/provided stated minutes of critical care time to this patient: 60

## 2023-06-16 NOTE — BRIEF OPERATIVE NOTE - NSICDXBRIEFPROCEDURE_GEN_ALL_CORE_FT
PROCEDURES:  Minimally invasive direct coronary artery bypass (MIDCAB) with transesophageal echocardiography (JULI) 16-Jun-2023 12:33:10 MidCAB, LIMA-LAD, EF 60-65% Purnima Simmons

## 2023-06-17 LAB
ALBUMIN SERPL ELPH-MCNC: 3.5 G/DL — SIGNIFICANT CHANGE UP (ref 3.3–5)
ALBUMIN SERPL ELPH-MCNC: 3.9 G/DL — SIGNIFICANT CHANGE UP (ref 3.3–5)
ALP SERPL-CCNC: 76 U/L — SIGNIFICANT CHANGE UP (ref 40–120)
ALP SERPL-CCNC: 78 U/L — SIGNIFICANT CHANGE UP (ref 40–120)
ALT FLD-CCNC: 13 U/L — SIGNIFICANT CHANGE UP (ref 10–45)
ALT FLD-CCNC: 13 U/L — SIGNIFICANT CHANGE UP (ref 10–45)
ANION GAP SERPL CALC-SCNC: 9 MMOL/L — SIGNIFICANT CHANGE UP (ref 5–17)
ANION GAP SERPL CALC-SCNC: 9 MMOL/L — SIGNIFICANT CHANGE UP (ref 5–17)
APTT BLD: 30.1 SEC — SIGNIFICANT CHANGE UP (ref 27.5–35.5)
APTT BLD: 33.7 SEC — SIGNIFICANT CHANGE UP (ref 27.5–35.5)
AST SERPL-CCNC: 26 U/L — SIGNIFICANT CHANGE UP (ref 10–40)
AST SERPL-CCNC: 27 U/L — SIGNIFICANT CHANGE UP (ref 10–40)
BASOPHILS # BLD AUTO: 0.03 K/UL — SIGNIFICANT CHANGE UP (ref 0–0.2)
BASOPHILS NFR BLD AUTO: 0.2 % — SIGNIFICANT CHANGE UP (ref 0–2)
BILIRUB SERPL-MCNC: 0.6 MG/DL — SIGNIFICANT CHANGE UP (ref 0.2–1.2)
BILIRUB SERPL-MCNC: 0.7 MG/DL — SIGNIFICANT CHANGE UP (ref 0.2–1.2)
BUN SERPL-MCNC: 12 MG/DL — SIGNIFICANT CHANGE UP (ref 7–23)
BUN SERPL-MCNC: 13 MG/DL — SIGNIFICANT CHANGE UP (ref 7–23)
CALCIUM SERPL-MCNC: 8.1 MG/DL — LOW (ref 8.4–10.5)
CALCIUM SERPL-MCNC: 8.2 MG/DL — LOW (ref 8.4–10.5)
CHLORIDE SERPL-SCNC: 104 MMOL/L — SIGNIFICANT CHANGE UP (ref 96–108)
CHLORIDE SERPL-SCNC: 104 MMOL/L — SIGNIFICANT CHANGE UP (ref 96–108)
CO2 SERPL-SCNC: 23 MMOL/L — SIGNIFICANT CHANGE UP (ref 22–31)
CO2 SERPL-SCNC: 24 MMOL/L — SIGNIFICANT CHANGE UP (ref 22–31)
CREAT SERPL-MCNC: 0.96 MG/DL — SIGNIFICANT CHANGE UP (ref 0.5–1.3)
CREAT SERPL-MCNC: 0.99 MG/DL — SIGNIFICANT CHANGE UP (ref 0.5–1.3)
EGFR: 80 ML/MIN/1.73M2 — SIGNIFICANT CHANGE UP
EGFR: 83 ML/MIN/1.73M2 — SIGNIFICANT CHANGE UP
EOSINOPHIL # BLD AUTO: 0 K/UL — SIGNIFICANT CHANGE UP (ref 0–0.5)
EOSINOPHIL NFR BLD AUTO: 0 % — SIGNIFICANT CHANGE UP (ref 0–6)
GAS PNL BLDA: SIGNIFICANT CHANGE UP
GAS PNL BLDA: SIGNIFICANT CHANGE UP
GLUCOSE BLDC GLUCOMTR-MCNC: 103 MG/DL — HIGH (ref 70–99)
GLUCOSE BLDC GLUCOMTR-MCNC: 128 MG/DL — HIGH (ref 70–99)
GLUCOSE BLDC GLUCOMTR-MCNC: 133 MG/DL — HIGH (ref 70–99)
GLUCOSE BLDC GLUCOMTR-MCNC: 159 MG/DL — HIGH (ref 70–99)
GLUCOSE SERPL-MCNC: 113 MG/DL — HIGH (ref 70–99)
GLUCOSE SERPL-MCNC: 127 MG/DL — HIGH (ref 70–99)
HCT VFR BLD CALC: 37 % — LOW (ref 39–50)
HCT VFR BLD CALC: 39.9 % — SIGNIFICANT CHANGE UP (ref 39–50)
HGB BLD-MCNC: 12.8 G/DL — LOW (ref 13–17)
HGB BLD-MCNC: 13.6 G/DL — SIGNIFICANT CHANGE UP (ref 13–17)
IMM GRANULOCYTES NFR BLD AUTO: 0.5 % — SIGNIFICANT CHANGE UP (ref 0–0.9)
INR BLD: 1.17 — HIGH (ref 0.88–1.16)
INR BLD: 1.17 — HIGH (ref 0.88–1.16)
LYMPHOCYTES # BLD AUTO: 1.42 K/UL — SIGNIFICANT CHANGE UP (ref 1–3.3)
LYMPHOCYTES # BLD AUTO: 9.6 % — LOW (ref 13–44)
MAGNESIUM SERPL-MCNC: 2.4 MG/DL — SIGNIFICANT CHANGE UP (ref 1.6–2.6)
MAGNESIUM SERPL-MCNC: 2.4 MG/DL — SIGNIFICANT CHANGE UP (ref 1.6–2.6)
MCHC RBC-ENTMCNC: 32.4 PG — SIGNIFICANT CHANGE UP (ref 27–34)
MCHC RBC-ENTMCNC: 32.6 PG — SIGNIFICANT CHANGE UP (ref 27–34)
MCHC RBC-ENTMCNC: 34.1 GM/DL — SIGNIFICANT CHANGE UP (ref 32–36)
MCHC RBC-ENTMCNC: 34.6 GM/DL — SIGNIFICANT CHANGE UP (ref 32–36)
MCV RBC AUTO: 94.1 FL — SIGNIFICANT CHANGE UP (ref 80–100)
MCV RBC AUTO: 95 FL — SIGNIFICANT CHANGE UP (ref 80–100)
MONOCYTES # BLD AUTO: 1.07 K/UL — HIGH (ref 0–0.9)
MONOCYTES NFR BLD AUTO: 7.3 % — SIGNIFICANT CHANGE UP (ref 2–14)
NEUTROPHILS # BLD AUTO: 12.16 K/UL — HIGH (ref 1.8–7.4)
NEUTROPHILS NFR BLD AUTO: 82.4 % — HIGH (ref 43–77)
NRBC # BLD: 0 /100 WBCS — SIGNIFICANT CHANGE UP (ref 0–0)
NRBC # BLD: 0 /100 WBCS — SIGNIFICANT CHANGE UP (ref 0–0)
PHOSPHATE SERPL-MCNC: 2.6 MG/DL — SIGNIFICANT CHANGE UP (ref 2.5–4.5)
PHOSPHATE SERPL-MCNC: 3 MG/DL — SIGNIFICANT CHANGE UP (ref 2.5–4.5)
PLATELET # BLD AUTO: 207 K/UL — SIGNIFICANT CHANGE UP (ref 150–400)
PLATELET # BLD AUTO: 230 K/UL — SIGNIFICANT CHANGE UP (ref 150–400)
POTASSIUM SERPL-MCNC: 4.2 MMOL/L — SIGNIFICANT CHANGE UP (ref 3.5–5.3)
POTASSIUM SERPL-MCNC: 4.4 MMOL/L — SIGNIFICANT CHANGE UP (ref 3.5–5.3)
POTASSIUM SERPL-SCNC: 4.2 MMOL/L — SIGNIFICANT CHANGE UP (ref 3.5–5.3)
POTASSIUM SERPL-SCNC: 4.4 MMOL/L — SIGNIFICANT CHANGE UP (ref 3.5–5.3)
PROT SERPL-MCNC: 6.4 G/DL — SIGNIFICANT CHANGE UP (ref 6–8.3)
PROT SERPL-MCNC: 7.4 G/DL — SIGNIFICANT CHANGE UP (ref 6–8.3)
PROTHROM AB SERPL-ACNC: 13.9 SEC — HIGH (ref 10.5–13.4)
PROTHROM AB SERPL-ACNC: 13.9 SEC — HIGH (ref 10.5–13.4)
RBC # BLD: 3.93 M/UL — LOW (ref 4.2–5.8)
RBC # BLD: 4.2 M/UL — SIGNIFICANT CHANGE UP (ref 4.2–5.8)
RBC # FLD: 12.3 % — SIGNIFICANT CHANGE UP (ref 10.3–14.5)
RBC # FLD: 12.4 % — SIGNIFICANT CHANGE UP (ref 10.3–14.5)
SODIUM SERPL-SCNC: 136 MMOL/L — SIGNIFICANT CHANGE UP (ref 135–145)
SODIUM SERPL-SCNC: 137 MMOL/L — SIGNIFICANT CHANGE UP (ref 135–145)
WBC # BLD: 14.75 K/UL — HIGH (ref 3.8–10.5)
WBC # BLD: 16.91 K/UL — HIGH (ref 3.8–10.5)
WBC # FLD AUTO: 14.75 K/UL — HIGH (ref 3.8–10.5)
WBC # FLD AUTO: 16.91 K/UL — HIGH (ref 3.8–10.5)

## 2023-06-17 PROCEDURE — 71045 X-RAY EXAM CHEST 1 VIEW: CPT | Mod: 26

## 2023-06-17 RX ORDER — METOCLOPRAMIDE HCL 10 MG
10 TABLET ORAL ONCE
Refills: 0 | Status: COMPLETED | OUTPATIENT
Start: 2023-06-17 | End: 2023-06-17

## 2023-06-17 RX ORDER — SODIUM CHLORIDE 9 MG/ML
3 INJECTION INTRAMUSCULAR; INTRAVENOUS; SUBCUTANEOUS EVERY 8 HOURS
Refills: 0 | Status: DISCONTINUED | OUTPATIENT
Start: 2023-06-17 | End: 2023-06-20

## 2023-06-17 RX ADMIN — CHLORHEXIDINE GLUCONATE 1 APPLICATION(S): 213 SOLUTION TOPICAL at 12:00

## 2023-06-17 RX ADMIN — OXYCODONE HYDROCHLORIDE 5 MILLIGRAM(S): 5 TABLET ORAL at 12:34

## 2023-06-17 RX ADMIN — Medication 100 MILLIGRAM(S): at 08:00

## 2023-06-17 RX ADMIN — Medication 2: at 17:30

## 2023-06-17 RX ADMIN — TAMSULOSIN HYDROCHLORIDE 0.4 MILLIGRAM(S): 0.4 CAPSULE ORAL at 22:38

## 2023-06-17 RX ADMIN — Medication 650 MILLIGRAM(S): at 18:06

## 2023-06-17 RX ADMIN — Medication 650 MILLIGRAM(S): at 19:06

## 2023-06-17 RX ADMIN — Medication 100 MILLIGRAM(S): at 17:08

## 2023-06-17 RX ADMIN — OXYCODONE HYDROCHLORIDE 5 MILLIGRAM(S): 5 TABLET ORAL at 22:39

## 2023-06-17 RX ADMIN — OXYCODONE HYDROCHLORIDE 5 MILLIGRAM(S): 5 TABLET ORAL at 22:38

## 2023-06-17 RX ADMIN — OXYCODONE HYDROCHLORIDE 10 MILLIGRAM(S): 5 TABLET ORAL at 04:00

## 2023-06-17 RX ADMIN — SODIUM CHLORIDE 3 MILLILITER(S): 9 INJECTION INTRAMUSCULAR; INTRAVENOUS; SUBCUTANEOUS at 14:00

## 2023-06-17 RX ADMIN — Medication 81 MILLIGRAM(S): at 12:35

## 2023-06-17 RX ADMIN — POLYETHYLENE GLYCOL 3350 17 GRAM(S): 17 POWDER, FOR SOLUTION ORAL at 18:07

## 2023-06-17 RX ADMIN — SENNA PLUS 2 TABLET(S): 8.6 TABLET ORAL at 22:32

## 2023-06-17 RX ADMIN — SODIUM CHLORIDE 3 MILLILITER(S): 9 INJECTION INTRAMUSCULAR; INTRAVENOUS; SUBCUTANEOUS at 22:08

## 2023-06-17 RX ADMIN — Medication 100 MILLIGRAM(S): at 00:43

## 2023-06-17 RX ADMIN — OXYCODONE HYDROCHLORIDE 10 MILLIGRAM(S): 5 TABLET ORAL at 03:27

## 2023-06-17 RX ADMIN — OXYCODONE HYDROCHLORIDE 5 MILLIGRAM(S): 5 TABLET ORAL at 13:34

## 2023-06-17 RX ADMIN — CLOPIDOGREL BISULFATE 75 MILLIGRAM(S): 75 TABLET, FILM COATED ORAL at 12:34

## 2023-06-17 RX ADMIN — Medication 10 MILLIGRAM(S): at 06:37

## 2023-06-17 RX ADMIN — HEPARIN SODIUM 5000 UNIT(S): 5000 INJECTION INTRAVENOUS; SUBCUTANEOUS at 18:08

## 2023-06-17 RX ADMIN — Medication 12.5 MILLIGRAM(S): at 06:35

## 2023-06-17 RX ADMIN — Medication 12.5 MILLIGRAM(S): at 18:32

## 2023-06-17 RX ADMIN — PANTOPRAZOLE SODIUM 40 MILLIGRAM(S): 20 TABLET, DELAYED RELEASE ORAL at 18:07

## 2023-06-17 RX ADMIN — HEPARIN SODIUM 5000 UNIT(S): 5000 INJECTION INTRAVENOUS; SUBCUTANEOUS at 22:33

## 2023-06-17 RX ADMIN — ATORVASTATIN CALCIUM 40 MILLIGRAM(S): 80 TABLET, FILM COATED ORAL at 22:38

## 2023-06-17 RX ADMIN — HEPARIN SODIUM 5000 UNIT(S): 5000 INJECTION INTRAVENOUS; SUBCUTANEOUS at 06:35

## 2023-06-17 NOTE — PHYSICAL THERAPY INITIAL EVALUATION ADULT - DIAGNOSIS, PT EVAL
6B: Impaired Aerobic Capacity/Endurance Associated with Deconditioning 4I: Impaired Joint Mobility, Motor Function, Muscle Performance, and Range of Motion Associated with Bony or Soft Tissue Surgery

## 2023-06-17 NOTE — PHYSICAL THERAPY INITIAL EVALUATION ADULT - DID THE PATIENT HAVE SURGERY?
Minimally invasive direct coronary artery bypass (MIDCAB) with transesophageal echocardiography (JULI)/yes

## 2023-06-17 NOTE — PHYSICAL THERAPY INITIAL EVALUATION ADULT - PERTINENT HX OF CURRENT PROBLEM, REHAB EVAL
72yo English/ Tagalog speaking M, former smoker PMHx HTN, HLD, BPH, Afib (on Eliquis). Patient was admitted 6/2022 for syncope and subsequently diagnosed with afib. Underwent cardiac cath 5/20/23 that revealed dLM - 60%, ostial/prox LAD 80%, mid LAD 65% (severely calcified), ostial LCX 80%, mid LCX 65%, prox RCA 40%, distal RCA 30%, %  w/left-right collaterals. Dr. Stevenson consulted for consideration for hybrid procedure, deemed a good candidate for intervention. On 6/15/23 he presented to [facility] for pre operative workup prior to his scheduled procedure.

## 2023-06-17 NOTE — PROGRESS NOTE ADULT - SUBJECTIVE AND OBJECTIVE BOX
Patient discussed on morning rounds with Dr. Saldana     Operation / Date: 6/16/23 MidCAB, LIMA-LAD EF 60-65%    SUBJECTIVE ASSESSMENT:  73y Male seen and examined at bedside without complaints. Pt denies dizziness, vision changes, chest pain, palpitations, shortness of breath, cough, n/v/d, extremity swelling, calf tenderness.     Vital Signs Last 24 Hrs  T(C): 38.3 (17 Jun 2023 17:40), Max: 38.3 (17 Jun 2023 17:40)  T(F): 100.9 (17 Jun 2023 17:40), Max: 100.9 (17 Jun 2023 17:40)  HR: 70 (17 Jun 2023 12:31) (61 - 99)  BP: 104/69 (17 Jun 2023 12:31) (104/69 - 120/71)  BP(mean): 82 (17 Jun 2023 12:31) (80 - 90)  RR: 18 (17 Jun 2023 12:31) (16 - 18)  SpO2: 94% (17 Jun 2023 12:31) (91% - 99%)    Parameters below as of 17 Jun 2023 12:31  Patient On (Oxygen Delivery Method): nasal cannula w/ humidification  O2 Flow (L/min): 2    I&O's Detail    16 Jun 2023 07:01  -  17 Jun 2023 07:00  --------------------------------------------------------  IN:    Albumin 5%  - 250 mL: 250 mL    Insulin: 7 mL    IV PiggyBack: 300 mL    NiCARdipine: 75 mL    Oral Fluid: 460 mL    sodium chloride 0.9%: 180 mL  Total IN: 1272 mL    OUT:    Bulb (mL): 155 mL    Chest Tube (mL): 130 mL    Indwelling Catheter - Urethral (mL): 2410 mL  Total OUT: 2695 mL    Total NET: -1423 mL  17 Jun 2023 07:01  -  17 Jun 2023 17:59  --------------------------------------------------------  IN:    IV PiggyBack: 100 mL    Oral Fluid: 60 mL  Total IN: 160 mL    OUT:    Bulb (mL): 0 mL    Chest Tube (mL): 10 mL    Indwelling Catheter - Urethral (mL): 275 mL  Total OUT: 285 mL    Total NET: -125 mL    CHEST TUBE:  none   GAB DRAIN:  yes   EPICARDIAL WIRES: none    TIE DOWNS: yes   BAUTISTA: removed 6/17 pending TOV     PHYSICAL EXAM:  GEN: NAD, looks comfortable  Psych: Mood appropriate  Neuro: A&Ox3.  No focal deficits.  Moving all extremities.   HEENT: No obvious abnormalities  CV: S1S2, regular, no murmurs appreciated.  No carotid bruits.  No JVD  Lungs: Clear B/L.  No wheezing, rales or rhonchi  ABD: Soft, non-tender, non-distended.  +Bowel sounds  EXT: Warm and well perfused.  No peripheral edema noted  Musculoskeletal: Moving all extremities with normal ROM, no joint swelling  Incisions: Left mini thoracotomy site clean dry and intact covered with dermabond, gab drain in place.      LABS:                        13.6   16.91 )-----------( 230      ( 17 Jun 2023 07:17 )             39.9       PT/INR - ( 17 Jun 2023 07:17 )   PT: 13.9 sec;   INR: 1.17          PTT - ( 17 Jun 2023 07:17 )  PTT:33.7 sec    06-17    136  |  104  |  13  ----------------------------<  127<H>  4.2   |  23  |  0.99    Ca    8.1<L>      17 Jun 2023 07:17  Phos  2.6     06-17  Mg     2.4     06-17    TPro  7.4  /  Alb  3.9  /  TBili  0.7  /  DBili  x   /  AST  27  /  ALT  13  /  AlkPhos  78  06-17    MEDICATIONS  (STANDING):  aspirin enteric coated 81 milliGRAM(s) Oral daily  atorvastatin 40 milliGRAM(s) Oral at bedtime  ceFAZolin   IVPB 2000 milliGRAM(s) IV Intermittent every 8 hours  chlorhexidine 2% Cloths 1 Application(s) Topical daily  clopidogrel Tablet 75 milliGRAM(s) Oral daily  dextrose 5%. 1000 milliLiter(s) (50 mL/Hr) IV Continuous <Continuous>  dextrose 5%. 1000 milliLiter(s) (100 mL/Hr) IV Continuous <Continuous>  dextrose 50% Injectable 25 Gram(s) IV Push once  dextrose 50% Injectable 12.5 Gram(s) IV Push once  dextrose 50% Injectable 25 Gram(s) IV Push once  glucagon  Injectable 1 milliGRAM(s) IntraMuscular once  heparin   Injectable 5000 Unit(s) SubCutaneous every 8 hours  insulin lispro (ADMELOG) corrective regimen sliding scale   SubCutaneous Before meals and at bedtime  metoprolol tartrate 12.5 milliGRAM(s) Oral every 12 hours  pantoprazole    Tablet 40 milliGRAM(s) Oral daily  polyethylene glycol 3350 17 Gram(s) Oral daily  senna 2 Tablet(s) Oral at bedtime  sodium chloride 0.9% lock flush 3 milliLiter(s) IV Push every 8 hours  tamsulosin 0.4 milliGRAM(s) Oral at bedtime    MEDICATIONS  (PRN):  acetaminophen     Tablet .. 650 milliGRAM(s) Oral every 6 hours PRN Mild Pain (1 - 3)  bisacodyl 5 milliGRAM(s) Oral every 12 hours PRN Constipation  dextrose Oral Gel 15 Gram(s) Oral once PRN Blood Glucose LESS THAN 70 milliGRAM(s)/deciliter  oxyCODONE    IR 5 milliGRAM(s) Oral every 4 hours PRN Moderate Pain (4 - 6)  oxyCODONE    IR 10 milliGRAM(s) Oral every 6 hours PRN Severe Pain (7 - 10)    RADIOLOGY & ADDITIONAL TESTS:  < from: Xray Chest 1 View-PORTABLE IMMEDIATE (Xray Chest 1 View-PORTABLE IMMEDIATE .) (06.17.23 @ 12:11) >  IMPRESSION: Interval removal of a left-sided chest tube. Remaining   support devices in unchanged position. No pneumothorax.    --- End of Report ---    < end of copied text >

## 2023-06-18 LAB
ANION GAP SERPL CALC-SCNC: 10 MMOL/L — SIGNIFICANT CHANGE UP (ref 5–17)
BUN SERPL-MCNC: 15 MG/DL — SIGNIFICANT CHANGE UP (ref 7–23)
CALCIUM SERPL-MCNC: 8.7 MG/DL — SIGNIFICANT CHANGE UP (ref 8.4–10.5)
CHLORIDE SERPL-SCNC: 103 MMOL/L — SIGNIFICANT CHANGE UP (ref 96–108)
CO2 SERPL-SCNC: 26 MMOL/L — SIGNIFICANT CHANGE UP (ref 22–31)
CREAT SERPL-MCNC: 1.03 MG/DL — SIGNIFICANT CHANGE UP (ref 0.5–1.3)
EGFR: 76 ML/MIN/1.73M2 — SIGNIFICANT CHANGE UP
GLUCOSE BLDC GLUCOMTR-MCNC: 101 MG/DL — HIGH (ref 70–99)
GLUCOSE BLDC GLUCOMTR-MCNC: 113 MG/DL — HIGH (ref 70–99)
GLUCOSE BLDC GLUCOMTR-MCNC: 115 MG/DL — HIGH (ref 70–99)
GLUCOSE BLDC GLUCOMTR-MCNC: 130 MG/DL — HIGH (ref 70–99)
GLUCOSE SERPL-MCNC: 109 MG/DL — HIGH (ref 70–99)
HCT VFR BLD CALC: 43.2 % — SIGNIFICANT CHANGE UP (ref 39–50)
HGB BLD-MCNC: 14.2 G/DL — SIGNIFICANT CHANGE UP (ref 13–17)
MAGNESIUM SERPL-MCNC: 2.3 MG/DL — SIGNIFICANT CHANGE UP (ref 1.6–2.6)
MCHC RBC-ENTMCNC: 32.1 PG — SIGNIFICANT CHANGE UP (ref 27–34)
MCHC RBC-ENTMCNC: 32.9 GM/DL — SIGNIFICANT CHANGE UP (ref 32–36)
MCV RBC AUTO: 97.7 FL — SIGNIFICANT CHANGE UP (ref 80–100)
NRBC # BLD: 0 /100 WBCS — SIGNIFICANT CHANGE UP (ref 0–0)
PLATELET # BLD AUTO: 199 K/UL — SIGNIFICANT CHANGE UP (ref 150–400)
POTASSIUM SERPL-MCNC: 4.6 MMOL/L — SIGNIFICANT CHANGE UP (ref 3.5–5.3)
POTASSIUM SERPL-SCNC: 4.6 MMOL/L — SIGNIFICANT CHANGE UP (ref 3.5–5.3)
RBC # BLD: 4.42 M/UL — SIGNIFICANT CHANGE UP (ref 4.2–5.8)
RBC # FLD: 12.4 % — SIGNIFICANT CHANGE UP (ref 10.3–14.5)
SODIUM SERPL-SCNC: 139 MMOL/L — SIGNIFICANT CHANGE UP (ref 135–145)
WBC # BLD: 16.44 K/UL — HIGH (ref 3.8–10.5)
WBC # FLD AUTO: 16.44 K/UL — HIGH (ref 3.8–10.5)

## 2023-06-18 PROCEDURE — 71046 X-RAY EXAM CHEST 2 VIEWS: CPT | Mod: 26

## 2023-06-18 PROCEDURE — 99232 SBSQ HOSP IP/OBS MODERATE 35: CPT | Mod: 24

## 2023-06-18 PROCEDURE — 71045 X-RAY EXAM CHEST 1 VIEW: CPT | Mod: 26,XE

## 2023-06-18 RX ORDER — METOPROLOL TARTRATE 50 MG
2.5 TABLET ORAL ONCE
Refills: 0 | Status: COMPLETED | OUTPATIENT
Start: 2023-06-18 | End: 2023-06-18

## 2023-06-18 RX ORDER — METOPROLOL TARTRATE 50 MG
12.5 TABLET ORAL ONCE
Refills: 0 | Status: COMPLETED | OUTPATIENT
Start: 2023-06-18 | End: 2023-06-18

## 2023-06-18 RX ORDER — METOPROLOL TARTRATE 50 MG
25 TABLET ORAL
Refills: 0 | Status: DISCONTINUED | OUTPATIENT
Start: 2023-06-18 | End: 2023-06-18

## 2023-06-18 RX ORDER — FUROSEMIDE 40 MG
20 TABLET ORAL DAILY
Refills: 0 | Status: DISCONTINUED | OUTPATIENT
Start: 2023-06-18 | End: 2023-06-20

## 2023-06-18 RX ORDER — METOPROLOL TARTRATE 50 MG
25 TABLET ORAL
Refills: 0 | Status: DISCONTINUED | OUTPATIENT
Start: 2023-06-19 | End: 2023-06-20

## 2023-06-18 RX ADMIN — OXYCODONE HYDROCHLORIDE 5 MILLIGRAM(S): 5 TABLET ORAL at 07:11

## 2023-06-18 RX ADMIN — HEPARIN SODIUM 5000 UNIT(S): 5000 INJECTION INTRAVENOUS; SUBCUTANEOUS at 21:54

## 2023-06-18 RX ADMIN — SODIUM CHLORIDE 3 MILLILITER(S): 9 INJECTION INTRAMUSCULAR; INTRAVENOUS; SUBCUTANEOUS at 15:16

## 2023-06-18 RX ADMIN — Medication 12.5 MILLIGRAM(S): at 06:01

## 2023-06-18 RX ADMIN — Medication 25 MILLIGRAM(S): at 17:07

## 2023-06-18 RX ADMIN — ATORVASTATIN CALCIUM 40 MILLIGRAM(S): 80 TABLET, FILM COATED ORAL at 21:55

## 2023-06-18 RX ADMIN — SODIUM CHLORIDE 3 MILLILITER(S): 9 INJECTION INTRAMUSCULAR; INTRAVENOUS; SUBCUTANEOUS at 06:53

## 2023-06-18 RX ADMIN — Medication 100 MILLIGRAM(S): at 00:00

## 2023-06-18 RX ADMIN — POLYETHYLENE GLYCOL 3350 17 GRAM(S): 17 POWDER, FOR SOLUTION ORAL at 11:45

## 2023-06-18 RX ADMIN — HEPARIN SODIUM 5000 UNIT(S): 5000 INJECTION INTRAVENOUS; SUBCUTANEOUS at 15:02

## 2023-06-18 RX ADMIN — SODIUM CHLORIDE 3 MILLILITER(S): 9 INJECTION INTRAMUSCULAR; INTRAVENOUS; SUBCUTANEOUS at 22:01

## 2023-06-18 RX ADMIN — Medication 81 MILLIGRAM(S): at 11:45

## 2023-06-18 RX ADMIN — PANTOPRAZOLE SODIUM 40 MILLIGRAM(S): 20 TABLET, DELAYED RELEASE ORAL at 11:45

## 2023-06-18 RX ADMIN — Medication 650 MILLIGRAM(S): at 08:00

## 2023-06-18 RX ADMIN — Medication 2.5 MILLIGRAM(S): at 18:23

## 2023-06-18 RX ADMIN — Medication 12.5 MILLIGRAM(S): at 18:23

## 2023-06-18 RX ADMIN — CHLORHEXIDINE GLUCONATE 1 APPLICATION(S): 213 SOLUTION TOPICAL at 13:09

## 2023-06-18 RX ADMIN — SENNA PLUS 2 TABLET(S): 8.6 TABLET ORAL at 21:54

## 2023-06-18 RX ADMIN — CLOPIDOGREL BISULFATE 75 MILLIGRAM(S): 75 TABLET, FILM COATED ORAL at 11:46

## 2023-06-18 RX ADMIN — HEPARIN SODIUM 5000 UNIT(S): 5000 INJECTION INTRAVENOUS; SUBCUTANEOUS at 06:00

## 2023-06-18 RX ADMIN — TAMSULOSIN HYDROCHLORIDE 0.4 MILLIGRAM(S): 0.4 CAPSULE ORAL at 21:55

## 2023-06-18 RX ADMIN — Medication 650 MILLIGRAM(S): at 07:01

## 2023-06-18 RX ADMIN — OXYCODONE HYDROCHLORIDE 5 MILLIGRAM(S): 5 TABLET ORAL at 08:00

## 2023-06-18 NOTE — PROGRESS NOTE ADULT - SUBJECTIVE AND OBJECTIVE BOX
Patient discussed on morning rounds with Dr. Saldana    Operation / Date: 6/16/23 MidCAB, LIMA-LAD EF 60-65%    SUBJECTIVE ASSESSMENT:  73y Male seen and examined at bedside with no complaints. Pt denies dizziness, vision changes, chest pain, palpitations, shortness of breath, cough, n/v/d, extremity swelling, calf tenderness.     Vital Signs Last 24 Hrs  T(C): 37.7 (18 Jun 2023 14:58), Max: 38.3 (17 Jun 2023 17:40)  T(F): 99.8 (18 Jun 2023 14:58), Max: 100.9 (17 Jun 2023 17:40)  HR: 82 (18 Jun 2023 13:20) (64 - 96)  BP: 100/59 (18 Jun 2023 13:20) (96/54 - 133/67)  BP(mean): 74 (18 Jun 2023 13:20) (69 - 94)  RR: 17 (18 Jun 2023 13:20) (16 - 18)  SpO2: 93% (18 Jun 2023 13:20) (92% - 94%)    Parameters below as of 18 Jun 2023 13:20  Patient On (Oxygen Delivery Method): room air    I&O's Detail    17 Jun 2023 07:01  -  18 Jun 2023 07:00  --------------------------------------------------------  IN:    IV PiggyBack: 300 mL    Oral Fluid: 420 mL  Total IN: 720 mL    OUT:    Bulb (mL): 105 mL    Chest Tube (mL): 10 mL    Indwelling Catheter - Urethral (mL): 650 mL    Voided (mL): 1000 mL  Total OUT: 1765 mL    Total NET: -1045 mL    CHEST TUBE:  none  GAB DRAIN:  yes   EPICARDIAL WIRES: none    TIE DOWNS: yes   BAUTISTA: none    PHYSICAL EXAM:  GEN: NAD, looks comfortable  Psych: Mood appropriate  Neuro: A&Ox3.  No focal deficits.  Moving all extremities.   HEENT: No obvious abnormalities  CV: S1S2, regular, no murmurs appreciated.  No carotid bruits.  No JVD  Lungs: Clear B/L.  No wheezing, rales or rhonchi  ABD: Soft, non-tender, non-distended.  +Bowel sounds  EXT: Warm and well perfused.  No peripheral edema noted  Musculoskeletal: Moving all extremities with normal ROM, no joint swelling  Incisions: Left mini thoracotomy site clean dry and intact covered with dermabond, gab drain in place.      LABS:                        14.2   16.44 )-----------( 199      ( 18 Jun 2023 05:30 )             43.2       PT/INR - ( 17 Jun 2023 07:17 )   PT: 13.9 sec;   INR: 1.17          PTT - ( 17 Jun 2023 07:17 )  PTT:33.7 sec    06-18    139  |  103  |  15  ----------------------------<  109<H>  4.6   |  26  |  1.03    Ca    8.7      18 Jun 2023 05:30  Phos  2.6     06-17  Mg     2.3     06-18    TPro  7.4  /  Alb  3.9  /  TBili  0.7  /  DBili  x   /  AST  27  /  ALT  13  /  AlkPhos  78  06-17    MEDICATIONS  (STANDING):  aspirin enteric coated 81 milliGRAM(s) Oral daily  atorvastatin 40 milliGRAM(s) Oral at bedtime  bisacodyl Suppository 10 milliGRAM(s) Rectal once  chlorhexidine 2% Cloths 1 Application(s) Topical daily  clopidogrel Tablet 75 milliGRAM(s) Oral daily  dextrose 5%. 1000 milliLiter(s) (50 mL/Hr) IV Continuous <Continuous>  dextrose 5%. 1000 milliLiter(s) (100 mL/Hr) IV Continuous <Continuous>  dextrose 50% Injectable 25 Gram(s) IV Push once  dextrose 50% Injectable 12.5 Gram(s) IV Push once  dextrose 50% Injectable 25 Gram(s) IV Push once  glucagon  Injectable 1 milliGRAM(s) IntraMuscular once  heparin   Injectable 5000 Unit(s) SubCutaneous every 8 hours  insulin lispro (ADMELOG) corrective regimen sliding scale   SubCutaneous Before meals and at bedtime  metoprolol tartrate 12.5 milliGRAM(s) Oral every 12 hours  pantoprazole    Tablet 40 milliGRAM(s) Oral daily  polyethylene glycol 3350 17 Gram(s) Oral daily  senna 2 Tablet(s) Oral at bedtime  sodium chloride 0.9% lock flush 3 milliLiter(s) IV Push every 8 hours  tamsulosin 0.4 milliGRAM(s) Oral at bedtime    MEDICATIONS  (PRN):  acetaminophen     Tablet .. 650 milliGRAM(s) Oral every 6 hours PRN Mild Pain (1 - 3)  bisacodyl 5 milliGRAM(s) Oral every 12 hours PRN Constipation  dextrose Oral Gel 15 Gram(s) Oral once PRN Blood Glucose LESS THAN 70 milliGRAM(s)/deciliter  oxyCODONE    IR 5 milliGRAM(s) Oral every 4 hours PRN Moderate Pain (4 - 6)  oxyCODONE    IR 10 milliGRAM(s) Oral every 6 hours PRN Severe Pain (7 - 10)    RADIOLOGY & ADDITIONAL TESTS:  < from: Xray Chest 2 Views PA/Lat (06.18.23 @ 10:44) >  Findings:    6/17/2023, 11:52 AM.  Right central venous catheter with tip in the right atrium. Left-sided   chest tube. Bibasilar atelectasis. Trace bilateral effusions. The   cardiomediastinal silhouette is normal. Bones are grossly normal.    6/18/2023, 5:08 AM.  No significant change in comparison to prior.      IMPRESSION: Serial chest radiographs as above.    --- End of Report ---      < end of copied text >

## 2023-06-18 NOTE — PROVIDER CONTACT NOTE (OTHER) - SITUATION
Patient is POD 2#  pMH Afib  no use of cvc during my shift
Informed by covering RN during this RN's break that  patient went into RAFIB 200 HR while sitting in chair, only complaint of feeling flutters in chest, otherwise stable.
PTT result 75.5

## 2023-06-18 NOTE — PROVIDER CONTACT NOTE (OTHER) - ACTION/TREATMENT ORDERED:
12.5mg metoprolol po and 2.5mg metoprolol ivp ordered during incident.  Patient returned to SR after med administration and provider above aware.
Provider ordered to remove cvc now.
Per Keyon Yung continue heparin therapy at current rate of 10cc/hr

## 2023-06-19 ENCOUNTER — TRANSCRIPTION ENCOUNTER (OUTPATIENT)
Age: 73
End: 2023-06-19

## 2023-06-19 LAB
ANION GAP SERPL CALC-SCNC: 9 MMOL/L — SIGNIFICANT CHANGE UP (ref 5–17)
BUN SERPL-MCNC: 13 MG/DL — SIGNIFICANT CHANGE UP (ref 7–23)
CALCIUM SERPL-MCNC: 8.4 MG/DL — SIGNIFICANT CHANGE UP (ref 8.4–10.5)
CHLORIDE SERPL-SCNC: 102 MMOL/L — SIGNIFICANT CHANGE UP (ref 96–108)
CO2 SERPL-SCNC: 26 MMOL/L — SIGNIFICANT CHANGE UP (ref 22–31)
CREAT SERPL-MCNC: 1.01 MG/DL — SIGNIFICANT CHANGE UP (ref 0.5–1.3)
EGFR: 78 ML/MIN/1.73M2 — SIGNIFICANT CHANGE UP
GLUCOSE BLDC GLUCOMTR-MCNC: 104 MG/DL — HIGH (ref 70–99)
GLUCOSE BLDC GLUCOMTR-MCNC: 106 MG/DL — HIGH (ref 70–99)
GLUCOSE BLDC GLUCOMTR-MCNC: 110 MG/DL — HIGH (ref 70–99)
GLUCOSE BLDC GLUCOMTR-MCNC: 123 MG/DL — HIGH (ref 70–99)
GLUCOSE SERPL-MCNC: 106 MG/DL — HIGH (ref 70–99)
HCT VFR BLD CALC: 38.4 % — LOW (ref 39–50)
HGB BLD-MCNC: 12.8 G/DL — LOW (ref 13–17)
MAGNESIUM SERPL-MCNC: 2.1 MG/DL — SIGNIFICANT CHANGE UP (ref 1.6–2.6)
MCHC RBC-ENTMCNC: 32.1 PG — SIGNIFICANT CHANGE UP (ref 27–34)
MCHC RBC-ENTMCNC: 33.3 GM/DL — SIGNIFICANT CHANGE UP (ref 32–36)
MCV RBC AUTO: 96.2 FL — SIGNIFICANT CHANGE UP (ref 80–100)
NRBC # BLD: 0 /100 WBCS — SIGNIFICANT CHANGE UP (ref 0–0)
PLATELET # BLD AUTO: 176 K/UL — SIGNIFICANT CHANGE UP (ref 150–400)
POTASSIUM SERPL-MCNC: 4.3 MMOL/L — SIGNIFICANT CHANGE UP (ref 3.5–5.3)
POTASSIUM SERPL-SCNC: 4.3 MMOL/L — SIGNIFICANT CHANGE UP (ref 3.5–5.3)
RBC # BLD: 3.99 M/UL — LOW (ref 4.2–5.8)
RBC # FLD: 12.5 % — SIGNIFICANT CHANGE UP (ref 10.3–14.5)
SODIUM SERPL-SCNC: 137 MMOL/L — SIGNIFICANT CHANGE UP (ref 135–145)
WBC # BLD: 12.04 K/UL — HIGH (ref 3.8–10.5)
WBC # FLD AUTO: 12.04 K/UL — HIGH (ref 3.8–10.5)

## 2023-06-19 PROCEDURE — 92978 ENDOLUMINL IVUS OCT C 1ST: CPT | Mod: 26,LM

## 2023-06-19 PROCEDURE — 92979 ENDOLUMINL IVUS OCT C EA: CPT | Mod: 26,LC

## 2023-06-19 PROCEDURE — 92928 PRQ TCAT PLMT NTRAC ST 1 LES: CPT | Mod: LM

## 2023-06-19 PROCEDURE — 99232 SBSQ HOSP IP/OBS MODERATE 35: CPT

## 2023-06-19 PROCEDURE — 99152 MOD SED SAME PHYS/QHP 5/>YRS: CPT

## 2023-06-19 PROCEDURE — 92933 PRQ TRLML C ATHRC ST ANGIOP1: CPT | Mod: LC

## 2023-06-19 PROCEDURE — 93455 CORONARY ART/GRFT ANGIO S&I: CPT | Mod: 26,59

## 2023-06-19 RX ORDER — CLOPIDOGREL BISULFATE 75 MG/1
300 TABLET, FILM COATED ORAL ONCE
Refills: 0 | Status: DISCONTINUED | OUTPATIENT
Start: 2023-06-19 | End: 2023-06-19

## 2023-06-19 RX ORDER — ALBUMIN HUMAN 25 %
250 VIAL (ML) INTRAVENOUS
Refills: 0 | Status: COMPLETED | OUTPATIENT
Start: 2023-06-19 | End: 2023-06-19

## 2023-06-19 RX ORDER — SODIUM CHLORIDE 9 MG/ML
500 INJECTION INTRAMUSCULAR; INTRAVENOUS; SUBCUTANEOUS
Refills: 0 | Status: DISCONTINUED | OUTPATIENT
Start: 2023-06-19 | End: 2023-06-20

## 2023-06-19 RX ORDER — CLOPIDOGREL BISULFATE 75 MG/1
300 TABLET, FILM COATED ORAL ONCE
Refills: 0 | Status: COMPLETED | OUTPATIENT
Start: 2023-06-19 | End: 2023-06-19

## 2023-06-19 RX ADMIN — HEPARIN SODIUM 5000 UNIT(S): 5000 INJECTION INTRAVENOUS; SUBCUTANEOUS at 13:36

## 2023-06-19 RX ADMIN — Medication 500 MILLILITER(S): at 22:33

## 2023-06-19 RX ADMIN — Medication 81 MILLIGRAM(S): at 11:16

## 2023-06-19 RX ADMIN — SODIUM CHLORIDE 3 MILLILITER(S): 9 INJECTION INTRAMUSCULAR; INTRAVENOUS; SUBCUTANEOUS at 13:12

## 2023-06-19 RX ADMIN — CLOPIDOGREL BISULFATE 300 MILLIGRAM(S): 75 TABLET, FILM COATED ORAL at 13:36

## 2023-06-19 RX ADMIN — Medication 650 MILLIGRAM(S): at 00:18

## 2023-06-19 RX ADMIN — Medication 25 MILLIGRAM(S): at 18:22

## 2023-06-19 RX ADMIN — ATORVASTATIN CALCIUM 40 MILLIGRAM(S): 80 TABLET, FILM COATED ORAL at 22:24

## 2023-06-19 RX ADMIN — SENNA PLUS 2 TABLET(S): 8.6 TABLET ORAL at 22:24

## 2023-06-19 RX ADMIN — Medication 20 MILLIGRAM(S): at 05:39

## 2023-06-19 RX ADMIN — HEPARIN SODIUM 5000 UNIT(S): 5000 INJECTION INTRAVENOUS; SUBCUTANEOUS at 05:38

## 2023-06-19 RX ADMIN — Medication 500 MILLILITER(S): at 22:30

## 2023-06-19 RX ADMIN — SODIUM CHLORIDE 3 MILLILITER(S): 9 INJECTION INTRAMUSCULAR; INTRAVENOUS; SUBCUTANEOUS at 05:46

## 2023-06-19 RX ADMIN — Medication 650 MILLIGRAM(S): at 01:15

## 2023-06-19 RX ADMIN — SODIUM CHLORIDE 3 MILLILITER(S): 9 INJECTION INTRAMUSCULAR; INTRAVENOUS; SUBCUTANEOUS at 21:54

## 2023-06-19 RX ADMIN — CLOPIDOGREL BISULFATE 75 MILLIGRAM(S): 75 TABLET, FILM COATED ORAL at 11:16

## 2023-06-19 RX ADMIN — Medication 650 MILLIGRAM(S): at 22:56

## 2023-06-19 RX ADMIN — HEPARIN SODIUM 5000 UNIT(S): 5000 INJECTION INTRAVENOUS; SUBCUTANEOUS at 22:25

## 2023-06-19 RX ADMIN — PANTOPRAZOLE SODIUM 40 MILLIGRAM(S): 20 TABLET, DELAYED RELEASE ORAL at 11:16

## 2023-06-19 RX ADMIN — TAMSULOSIN HYDROCHLORIDE 0.4 MILLIGRAM(S): 0.4 CAPSULE ORAL at 22:24

## 2023-06-19 RX ADMIN — POLYETHYLENE GLYCOL 3350 17 GRAM(S): 17 POWDER, FOR SOLUTION ORAL at 11:15

## 2023-06-19 RX ADMIN — Medication 5 MILLIGRAM(S): at 00:15

## 2023-06-19 NOTE — PROGRESS NOTE ADULT - ASSESSMENT
72 YO, former smoker, English/ Tagalog-speaking Male PMHx HTN, HLD, BPH, Afib (on Eliquis) who was s/p cardiac cath 5/20/23 that revealed dLM - 60%, ostial/prox LAD 80%, mid LAD 65% (severely calcified), ostial LCX 80%, mid LCX 65%, prox RCA 40%, distal RCA 30%, %  w/left-right collaterals. Dr. Stevenson consulted for consideration for hybrid procedure, deemed a good candidate for intervention. On 6/15/23 underwent a  MidCAB, LIMA-LAD EF 60-65%. Arrived to the CTICU extubated on no drips, Started cardene for HTN. POD1 BB started, weaned off cardene, transferred to floor. Pleural drain removed. POD2 gab to remain in per Dr. Saldana, PA/LAT CXR completed and low dose lasix started. POD3 plan for PCI with Dr. Garcia today.    Plan:    Neurovascular:   -Pain well controlled with current regimen. PRN's: Tylenol and Oxycodone    Cardiovascular:   -CAD s/p MIDCAB (LIMA to LAD) on 6/16/23  -Plan for PCI today with Dr. Garcia  -Cont ASA, Plavix, BB, statin  -Cont Lasix daily  -Hx of HTN  -Cont BB  -Hx of HLD  -Cont statin  -Hx of chronic A-Fib  -Hemodynamically stable.   -Monitor: BP, HR, tele    Respiratory:   -Oxygenating well on room air  -Encourage continued use of IS 10x/hr and frequent ambulation  -CXR: stable    GI:  -GI PPX: protonix  -PO Diet  -Bowel Regimen: dulcolax, miralax, senna     Renal / :  -Hx of BPH  -Cont Flomax  -Continue to monitor renal function: BUN/Cr: 13/1.01  -Monitor I/O's daily     Endocrine:    -No hx of DM or thyroid dx  -A1c: 5.8  -TSH: 1.89    Hematologic:  -CBC: H/H- 12.8/38.4  -Coagulation Panel.    ID:  -Temperature: Afebrile  -CBC: WBC- 12  -Continue to observe for SIRS/Sepsis Syndrome.    Prophylaxis:  -DVT prophylaxis with 5000 SubQ Heparin q8h.  -Continue with SCD's b/l while patient is at rest     Disposition:  -Discharge home once patient is medically ready  
74yo English/ Tagalog speaking male who is a former smoker PMHx HTN, HLD, BPH, Afib (on Eliquis). Patient was admitted 6/2022 for syncope and subsequently diagnosed with afib. Underwent cardiac cath 5/20/23 that revealed dLM - 60%, ostial/prox LAD 80%, mid LAD 65% (severely calcified), ostial LCX 80%, mid LCX 65%, prox RCA 40%, distal RCA 30%, %  w/left-right collaterals. Dr. Stevenson consulted for consideration for hybrid procedure, deemed a good candidate for intervention. On 6/15/23 underwent a  MidCAB, LIMA-LAD EF 60-65%  Arrived to the CTICU extubated on no drips, Started cardene for hypertension, POD 1 BB started, weaned off cardene, transferred to floor. Pleural drain removed. POD 2 gab to remain in per Dr. Saldana, pa/lat completed and low dose lasix started. plan for completion PCI this admission.     Neurovascular:   No delirium. Pain well controlled with current regimen.  acetaminophen     Tablet .. 650 milliGRAM(s) every 6 hours PRN  oxyCODONE    IR 5 milliGRAM(s) every 4 hours PRN  oxyCODONE    IR 10 milliGRAM(s) every 6 hours PRN    Cardiovascular:   -Hemodynamically stable. HR controlled  #MIDCAB   -continue aspirin and Plavix for acute graft patency,  -continue statin for long term graft patency  -continue metoprolol tartrate 12.5 milliGRAM(s) every 12 hours  , for BP control  -started on low dose lasix, K elevated no supp k started eval lytes in am   HR: 70 (61 - 99)  BP: 104/69 (104/69 - 120/71)    Respiratory:   02 Sat = 98% on RA.  RR: 18 (16 - 18)  SpO2: 94% (91% - 99%)  -Wean to RA from for O2 Sat > 93%.  -Encourage Cough, deep breathing and Use of IS 10x / hr while awake.  -Chest PT 4xdaily    GI:   Stable  Continue bisacodyl 5 milliGRAM(s) every 12 hours PRN  pantoprazole Tablet 40 milliGRAM(s) daily  polyethylene glycol 3350 17 Gram(s) daily  senna 2 Tablet(s) at bedtime  -PO DASH diet    Renal / :   BUN/Cr Stable 15/1.03  -flomax 0.4QD   -Continue to monitor I/O's.    Endocrine:    Blood sugar stable   A1C: 5.8  TSH: 1.890    Hematologic:  H/H stable 14.2/43.2  -DVT prophylaxis with Heparin sq    ID:  -Afebrile  -Continue to observe for SIRS/Sepsis Syndrome.  T(C): 38.3, Max: 38.3 (06-17-23 @ 17:40)    Disposition:  -PCI on tuesday     
74yo English/ Tagalog speaking male who is a former smoker PMHx HTN, HLD, BPH, Afib (on Eliquis). Patient was admitted 6/2022 for syncope and subsequently diagnosed with afib. Underwent cardiac cath 5/20/23 that revealed dLM - 60%, ostial/prox LAD 80%, mid LAD 65% (severely calcified), ostial LCX 80%, mid LCX 65%, prox RCA 40%, distal RCA 30%, %  w/left-right collaterals. Dr. Stevenson consulted for consideration for hybrid procedure, deemed a good candidate for intervention. On 6/15/23 underwent a  MidCAB, LIMA-LAD EF 60-65%  Arrived to the CTICU extubated on no drips, Started cardene for hypertension, POD BB started, weaned off cardene, transferred to floor. Pleural drain removed.     Neurovascular:   No delirium. Pain well controlled with current regimen.  acetaminophen     Tablet .. 650 milliGRAM(s) every 6 hours PRN  oxyCODONE    IR 5 milliGRAM(s) every 4 hours PRN  oxyCODONE    IR 10 milliGRAM(s) every 6 hours PRN    Cardiovascular:   -Hemodynamically stable. HR controlled  #MIDCAB   -continue aspirin and Plavix for acute graft patency,  -continue statin for long term graft patency  -continue metoprolol tartrate 12.5 milliGRAM(s) every 12 hours  , for BP control  HR: 70 (61 - 99)  BP: 104/69 (104/69 - 120/71)    Respiratory:   02 Sat = 98% on RA.  RR: 18 (16 - 18)  SpO2: 94% (91% - 99%)  -Wean to RA from for O2 Sat > 93%.  -Encourage Cough, deep breathing and Use of IS 10x / hr while awake.  -Chest PT 4xdaily    GI:   Stable  Continue bisacodyl 5 milliGRAM(s) every 12 hours PRN  pantoprazole Tablet 40 milliGRAM(s) daily  polyethylene glycol 3350 17 Gram(s) daily  senna 2 Tablet(s) at bedtime  -PO DASH diet    Renal / :   BUN/Cr Stable 13/0.99  -flomax 0.4QD   -Continue to monitor I/O's.    Endocrine:    Blood sugar stable   A1C: 5.8  TSH: 1.890    Hematologic:  H/H stable 13.6/39.9  -DVT prophylaxis with Heparin sq    ID:  -Afebrile  -Continue to observe for SIRS/Sepsis Syndrome.  T(C): 38.3, Max: 38.3 (06-17-23 @ 17:40)    Disposition:  -DC tomorrow

## 2023-06-19 NOTE — DISCHARGE NOTE PROVIDER - NSDCFUADDAPPT_GEN_ALL_CORE_FT
Please attend all follow-up appointments as scheduled. The office will call you with follow-up appointments. If you do not receive a call by tomorrow, please call the office.

## 2023-06-19 NOTE — DISCHARGE NOTE PROVIDER - NSDCCPTREATMENT_GEN_ALL_CORE_FT
PRINCIPAL PROCEDURE  Procedure: Minimally invasive direct coronary artery bypass (MIDCAB) with transesophageal echocardiography (JULI)  Findings and Treatment: MidCAB, LIMA-LAD, EF 60-65%

## 2023-06-19 NOTE — DISCHARGE NOTE PROVIDER - HOSPITAL COURSE
Patient discussed on morning rounds with  _____    Operation Date: MIDCAB (LIMA to LAD) on 6/16/23    Primary Surgeon/Attending MD: Dr. Stevenson    Referring Physician: Dr. Nigel Queen/Dr. Echavarria  _ _ _ _ _ _ _ _ _ _ _ _  HOSPITAL COURSE:  72 YO, former smoker, English/ Tagalog-speaking Male PMHx HTN, HLD, BPH, Afib (on Eliquis) who was s/p cardiac cath 5/20/23 that revealed dLM - 60%, ostial/prox LAD 80%, mid LAD 65% (severely calcified), ostial LCX 80%, mid LCX 65%, prox RCA 40%, distal RCA 30%, %  w/left-right collaterals. Dr. Stevenson consulted for consideration for hybrid procedure, deemed a good candidate for intervention. On 6/15/23 underwent a  MidCAB, LIMA-LAD EF 60-65%. Arrived to the CTICU extubated on no drips, Started cardene for HTN. POD1 BB started, weaned off cardene, transferred to floor. Pleural drain removed. POD2 gab to remain in per Dr. Saldana, PA/LAT CXR completed and low dose lasix started. POD3 plan for PCI with Dr. Garcia today.  _ _ _ _ _ _ _ _ _ _ _ _  DISCHARGE PHYSICAL EXAM:  General:  Neuro:  Cardio:  Pulm:  GI/:  Vascular:  Extremities:  Incisions:  _ _ _ _ _ _ _ _ _ _ _ _  REMOVAL CHECKLIST:        [ ] Epicardial wires          [ ] Stitches/tie downs,   If no, why? ______          [ ] PICC/Midline,   If no, why? ________  _ _ _ _ _ _ _ _ _ _ _ _   MEDICATION DISCHARGE CHECKLIST    CABG        [ ] Aspirin, [  ] Contraindicated, Reason_______________________________        [ ] Plavix, [  ] Contraindicated, Reason_______________________________        [ ] Statin, [  ] Contraindicated, Reason_______________________________        [ ] Lasix , [  ] Contraindicated, Reason_______________________________              Duration: _____        [ ] Beta-Blocker, [  ] Contraindicated, Reason_______________________________        PCI        [ ] Aspirin, [  ] Contraindicated, Reason_______________________________        [ ] Plavix, [ ] Contraindicated, Reason _______________________________        Anticoagulation        [ ] NOAC, [ ] Reason _______________________________              Cost/Insurance barriers addressed: YES/NO         [ ] Coumadin, [ ] Reason _______________________________              Dose:___________              INR Goal: ___________              Follow up established: YES/NO  _ _ _ _ _ _ _ _ _ _ _ _  Over 35 minutes was spent with the patient reviewing the discharge material including medications, follow up appointments, recovery, concerning symptoms, and how to contact their health care providers if they have questions   Patient discussed on morning rounds with  _____    Operation Date: MIDCAB (LIMA to LAD) on 6/16/23    Primary Surgeon/Attending MD: Dr. Stevenson    Referring Physician: Dr. Nigel Queen/Dr. Echavarria  _ _ _ _ _ _ _ _ _ _ _ _  HOSPITAL COURSE:  72 YO, former smoker, English/ Tagalog-speaking Male PMHx HTN, HLD, BPH, Afib (on Eliquis) who was s/p cardiac cath 5/20/23 that revealed dLM - 60%, ostial/prox LAD 80%, mid LAD 65% (severely calcified), ostial LCX 80%, mid LCX 65%, prox RCA 40%, distal RCA 30%, %  w/left-right collaterals. Dr. Stevenson consulted for consideration for hybrid procedure, deemed a good candidate for intervention. On 6/15/23 underwent a  MidCAB, LIMA-LAD EF 60-65%. Arrived to the CTICU extubated on no drips, Started cardene for HTN. POD1 BB started, weaned off cardene, transferred to floor. Pleural drain removed. POD2 gab to remain in per Dr. Saldana, PA/LAT CXR completed and low dose lasix started. POD3 Patient underwent cardiac cath with OTIS distal LM and IVUS guided Atherectomy/OTIS LCx, via RFA with Dr. Garcia. POD4 ________.  _ _ _ _ _ _ _ _ _ _ _ _  DISCHARGE PHYSICAL EXAM:  General:  Neuro:  Cardio:  Pulm:  GI/:  Vascular:  Extremities:  Incisions:  _ _ _ _ _ _ _ _ _ _ _ _  REMOVAL CHECKLIST:        [ ] Epicardial wires          [ ] Stitches/tie downs,   If no, why? ______          [ ] PICC/Midline,   If no, why? ________  _ _ _ _ _ _ _ _ _ _ _ _   MEDICATION DISCHARGE CHECKLIST    CABG        [ ] Aspirin, [  ] Contraindicated, Reason_______________________________        [ ] Plavix, [  ] Contraindicated, Reason_______________________________        [ ] Statin, [  ] Contraindicated, Reason_______________________________        [ ] Lasix , [  ] Contraindicated, Reason_______________________________              Duration: _____        [ ] Beta-Blocker, [  ] Contraindicated, Reason_______________________________        PCI        [ ] Aspirin, [  ] Contraindicated, Reason_______________________________        [ ] Plavix, [ ] Contraindicated, Reason _______________________________        Anticoagulation        [ ] NOAC, [ ] Reason _______________________________              Cost/Insurance barriers addressed: YES/NO         [ ] Coumadin, [ ] Reason _______________________________              Dose:___________              INR Goal: ___________              Follow up established: YES/NO  _ _ _ _ _ _ _ _ _ _ _ _  Over 35 minutes was spent with the patient reviewing the discharge material including medications, follow up appointments, recovery, concerning symptoms, and how to contact their health care providers if they have questions   Patient discussed on morning rounds with Dr. Deacon Dempsey.    Operation Date: MIDCAB (LIMA to LAD) on 6/16/23    Primary Surgeon/Attending MD: Dr. Stevenson    Referring Physician: Dr. Nigel Queen/Dr. Echavarria  _ _ _ _ _ _ _ _ _ _ _ _  HOSPITAL COURSE:  72 YO, former smoker, English/ Tagalog-speaking Male PMHx HTN, HLD, BPH, Afib (on Eliquis) who was s/p cardiac cath 5/20/23 that revealed dLM - 60%, ostial/prox LAD 80%, mid LAD 65% (severely calcified), ostial LCX 80%, mid LCX 65%, prox RCA 40%, distal RCA 30%, %  w/left-right collaterals. Dr. Stevenson consulted for consideration for hybrid procedure, deemed a good candidate for intervention. On 6/15/23 underwent a  MidCAB, LIMA-LAD EF 60-65%. Arrived to the CTICU extubated on no drips, Started cardene for HTN. POD1 BB started, weaned off cardene, transferred to floor. Pleural drain removed. POD2 haris to remain in per Dr. Saldana, PA/LAT CXR completed and low dose lasix started. POD3 Patient underwent cardiac cath with OTIS distal LM and IVUS guided Atherectomy/OTIS LCx, via RFA with Dr. Garcia. POD4 Remaining haris drain removed and CXR stable. Patient cleared for discharge home with 1 tie down in place per Dr. Deacon Dempsey.  _ _ _ _ _ _ _ _ _ _ _ _  DISCHARGE PHYSICAL EXAM:  GENERAL: NAD, lying in bed comfortably  HEAD:  Atraumatic, Normocephalic  EYES: EOMI, PERRLA, conjunctiva and sclera clear  ENT: Moist mucous membranes  NECK: Supple, No JVD  CHEST/LUNG: CTAB; L chest incision well-approximated with Dermabond. Haris drain removed w/ tie down x 1 in place. New C/D/I dressing placed over previous drain site.  HEART: RRR  ABDOMEN: Bowel sounds present; Soft, Nontender, Nondistended. No hepatomegally  EXTREMITIES:  2+ Peripheral Pulses, brisk capillary refill. No clubbing, cyanosis, or edema  NERVOUS SYSTEM:  Alert & Oriented X3, speech clear. No deficits   _ _ _ _ _ _ _ _ _ _ _ _  REMOVAL CHECKLIST:        [ X] Epicardial wires          [ ] Stitches/tie downs,   If no, why? ___Blake drain removed today___          [ ] PICC/Midline,   If no, why? ________  _ _ _ _ _ _ _ _ _ _ _ _   MEDICATION DISCHARGE CHECKLIST    CABG        [X ] Aspirin, [  ] Contraindicated, Reason_______________________________        [X ] Plavix, [  ] Contraindicated, Reason_______________________________        [X ] Statin, [  ] Contraindicated, Reason_______________________________        [X ] Lasix , [  ] Contraindicated, Reason_______________________________              Duration: _____        [X ] Beta-Blocker, [  ] Contraindicated, Reason_______________________________        PCI        [X ] Aspirin, [  ] Contraindicated, Reason_______________________________        [X ] Plavix, [ ] Contraindicated, Reason _______________________________  _ _ _ _ _ _ _ _ _ _ _ _  Over 35 minutes was spent with the patient reviewing the discharge material including medications, follow up appointments, recovery, concerning symptoms, and how to contact their health care providers if they have questions

## 2023-06-19 NOTE — PROGRESS NOTE ADULT - SUBJECTIVE AND OBJECTIVE BOX
Patient discussed on morning rounds with Dr. Stevenson     Operation / Date: MIDCAB (LIMA to LAD) on 6/16/23    SUBJECTIVE ASSESSMENT: Patient seen and examined at bedside. Patient states that he feels fine, offers no complaints. Denies chills, chest pain, SOB, palpitation, N/V.     Vital Signs Last 24 Hrs  T(C): 36.7 (19 Jun 2023 05:01), Max: 37.7 (18 Jun 2023 14:58)  T(F): 98 (19 Jun 2023 05:01), Max: 99.8 (18 Jun 2023 14:58)  HR: 98 (19 Jun 2023 09:02) (64 - 108)  BP: 121/76 (19 Jun 2023 09:02) (100/59 - 158/84)  BP(mean): 93 (19 Jun 2023 09:02) (74 - 114)  RR: 16 (19 Jun 2023 09:02) (15 - 18)  SpO2: 92% (19 Jun 2023 09:02) (92% - 94%)    Parameters below as of 19 Jun 2023 09:02  Patient On (Oxygen Delivery Method): room air    I&O's Detail    18 Jun 2023 07:01  -  19 Jun 2023 07:00  --------------------------------------------------------  IN:    Oral Fluid: 1180 mL  Total IN: 1180 mL    OUT:    Bulb (mL): 45 mL    Voided (mL): 2445 mL  Total OUT: 2490 mL    Total NET: -1310 mL    CHEST TUBE: None  HARIS DRAIN: Yes  EPICARDIAL WIRES: None  TIE DOWNS: None  BAUTISTA: None    PHYSICAL EXAM:  GENERAL: NAD, sitting upright in chair   HEAD:  Atraumatic, Normocephalic  EYES: EOMI, PERRLA, conjunctiva and sclera clear  ENT: Moist mucous membranes  NECK: Supple, No JVD  CHEST/LUNG: CTAB; Dressing over incision c/d/i. Haris drain on suction w/ SS drainage   HEART: RRR  ABDOMEN: Bowel sounds present; Soft, Nontender, Nondistended. No hepatomegally  EXTREMITIES:  2+ Peripheral Pulses, brisk capillary refill. No clubbing, cyanosis, or edema  NERVOUS SYSTEM:  Alert & Oriented X3, speech clear. No deficits     LABS:                        12.8   12.04 )-----------( 176      ( 19 Jun 2023 05:59 )             38.4     06-19    137  |  102  |  13  ----------------------------<  106<H>  4.3   |  26  |  1.01    Ca    8.4      19 Jun 2023 05:59  Mg     2.1     06-19    MEDICATIONS  (STANDING):  aspirin enteric coated 81 milliGRAM(s) Oral daily  atorvastatin 40 milliGRAM(s) Oral at bedtime  bisacodyl Suppository 10 milliGRAM(s) Rectal once  chlorhexidine 2% Cloths 1 Application(s) Topical daily  clopidogrel Tablet 75 milliGRAM(s) Oral daily  dextrose 5%. 1000 milliLiter(s) (50 mL/Hr) IV Continuous <Continuous>  dextrose 5%. 1000 milliLiter(s) (100 mL/Hr) IV Continuous <Continuous>  dextrose 50% Injectable 25 Gram(s) IV Push once  dextrose 50% Injectable 12.5 Gram(s) IV Push once  dextrose 50% Injectable 25 Gram(s) IV Push once  furosemide    Tablet 20 milliGRAM(s) Oral daily  glucagon  Injectable 1 milliGRAM(s) IntraMuscular once  heparin   Injectable 5000 Unit(s) SubCutaneous every 8 hours  insulin lispro (ADMELOG) corrective regimen sliding scale   SubCutaneous Before meals and at bedtime  metoprolol tartrate 25 milliGRAM(s) Oral two times a day  pantoprazole    Tablet 40 milliGRAM(s) Oral daily  polyethylene glycol 3350 17 Gram(s) Oral daily  senna 2 Tablet(s) Oral at bedtime  sodium chloride 0.9% lock flush 3 milliLiter(s) IV Push every 8 hours  tamsulosin 0.4 milliGRAM(s) Oral at bedtime    MEDICATIONS  (PRN):  acetaminophen     Tablet .. 650 milliGRAM(s) Oral every 6 hours PRN Mild Pain (1 - 3)  bisacodyl 5 milliGRAM(s) Oral every 12 hours PRN Constipation  dextrose Oral Gel 15 Gram(s) Oral once PRN Blood Glucose LESS THAN 70 milliGRAM(s)/deciliter  oxyCODONE    IR 5 milliGRAM(s) Oral every 4 hours PRN Moderate Pain (4 - 6)  oxyCODONE    IR 10 milliGRAM(s) Oral every 6 hours PRN Severe Pain (7 - 10)    RADIOLOGY & ADDITIONAL TESTS:  < from: Xray Chest 2 Views PA/Lat (06.18.23 @ 10:44) >  Findings:    6/17/2023, 11:52 AM.  Right central venous catheter with tip in the right atrium. Left-sided   chest tube. Bibasilar atelectasis. Trace bilateral effusions. The   cardiomediastinal silhouette is normal. Bones are grossly normal.    6/18/2023, 5:08 AM.  No significant change in comparison to prior.    IMPRESSION: Serial chest radiographs as above.

## 2023-06-19 NOTE — DISCHARGE NOTE PROVIDER - NSDCFUADDINST_GEN_ALL_CORE_FT
-Walk daily as tolerated and use your incentive spirometer 10 times every hour while you are awake.     -Please weigh yourself daily. If you notice over a 3 pound weight gain in 3 days, this is a sign you are likely retaining too much fluid. It is imperative you call our right away with unexplained rapid weight gain.      -Please continue to wear the compression stockings given to you in the hospital at home. This is a way to prevent fluid from building up in your legs.     -No driving or strenuous activity/exercise until cleared by your surgeon.    -Gently clean your incisions with unscented/antibacterial soap and water, pat dry.  You may leave them open to air.    -Call your doctor if you have shortness of breath, chest pain not relieved by pain medication, dizziness, fever >100.5, or increased redness or drainage from incisions.   -Walk daily as tolerated and use your incentive spirometer 10 times every hour while you are awake.     -Please weigh yourself daily. If you notice over a 3 pound weight gain in 3 days, this is a sign you are likely retaining too much fluid. It is imperative you call our right away with unexplained rapid weight gain.      -Please continue to wear the compression stockings given to you in the hospital at home. This is a way to prevent fluid from building up in your legs.     -No driving or strenuous activity/exercise until cleared by your surgeon.    -You may remove chest dressings in 48 hours. Please note you have one (1) remaining stitch in your skin, which will be removed at your follow-up appointment. Gently clean your incisions with unscented/antibacterial soap and water, pat dry.  You may leave them open to air.    -Call your doctor if you have shortness of breath, chest pain not relieved by pain medication, dizziness, fever >100.5, or increased redness or drainage from incisions.

## 2023-06-19 NOTE — DISCHARGE NOTE PROVIDER - NSDCMRMEDTOKEN_GEN_ALL_CORE_FT
atorvastatin 40 mg oral tablet: 1 orally once a day  Eliquis 5 mg oral tablet: 1 orally 2 times a day  felodipine 5 mg oral tablet, extended release: 1 orally once a day  losartan 100 mg oral tablet: 1 orally once a day  metoprolol succinate 50 mg oral capsule, extended release: 1 orally once a day  tamsulosin 0.4 mg oral capsule: 1 orally once a day   aspirin 81 mg oral tablet: 1 tab(s) orally once a day  atorvastatin 40 mg oral tablet: 1 tab(s) orally once a day (at bedtime)  Lasix 20 mg oral tablet: 1 tab(s) orally once a day  metoprolol tartrate 25 mg oral tablet: 1 tab(s) orally every 12 hours  MiraLax oral powder for reconstitution: 17 gram(s) orally once a day  oxyCODONE 5 mg oral tablet: 1 tab(s) orally every 6 hours as needed for  severe pain MDD: 4  Plavix 75 mg oral tablet: 1 tab(s) orally once a day  potassium chloride 10 mEq oral tablet, extended release: 1 tab(s) orally once a day While taking Lasix  Protonix 40 mg oral delayed release tablet: 1 tab(s) orally once a day  tamsulosin 0.4 mg oral capsule: 1 orally once a day  Tylenol 325 mg oral tablet: 2 tab(s) orally every 6 hours as needed for  mild pain

## 2023-06-19 NOTE — PROGRESS NOTE ADULT - SUBJECTIVE AND OBJECTIVE BOX
Interventional Cardiology PA Precath Note      HPI:          Prior Cath Hx:  CATH at [facility] 5/20/23 (Dr Queen)  dLM - 60%, ostial/prox LAD 80%, mid LAD 65% (severely calcified), ostial LCX 80%, mid LCX 65%, prox RCA 40%, distal RCA 30%, %  w/left-right collaterals. Dr. Stevenson consulted for consideration for hybrid procedure.     PAST MEDICAL HISTORY:  Afib     BPH (benign prostatic hyperplasia)     HLD (hyperlipidemia)     HTN (hypertension).     PAST SURGICAL HISTORY:  No significant past surgical history.     FAMILY HISTORY:  No pertinent family history in first degree relatives. No pertinent family history of: coronary disease.    Social History:  · Substance use	No   · Social History (marital status, living situation, occupation, and sexual history)	Former smoker 1ppd x 30 years  Social ETOH use  Denies recreational drug use      ALL: NKDA, NKFA. Denies shellfish/Contrast dye allergy.    FHx:       CURRENT MEDS:   acetaminophen     Tablet .. 650 milliGRAM(s) Oral every 6 hours PRN  aspirin enteric coated 81 milliGRAM(s) Oral daily  atorvastatin 40 milliGRAM(s) Oral at bedtime  bisacodyl 5 milliGRAM(s) Oral every 12 hours PRN  bisacodyl Suppository 10 milliGRAM(s) Rectal once  chlorhexidine 2% Cloths 1 Application(s) Topical daily  clopidogrel Tablet 75 milliGRAM(s) Oral daily - started  clopidogrel Tablet 300 milliGRAM(s) Oral once  dextrose 5%. 1000 milliLiter(s) IV Continuous <Continuous>  dextrose 5%. 1000 milliLiter(s) IV Continuous <Continuous>  dextrose 50% Injectable 25 Gram(s) IV Push once  dextrose 50% Injectable 12.5 Gram(s) IV Push once  dextrose 50% Injectable 25 Gram(s) IV Push once  dextrose Oral Gel 15 Gram(s) Oral once PRN  furosemide    Tablet 20 milliGRAM(s) Oral daily  glucagon  Injectable 1 milliGRAM(s) IntraMuscular once  heparin   Injectable 5000 Unit(s) SubCutaneous every 8 hours  insulin lispro (ADMELOG) corrective regimen sliding scale   SubCutaneous Before meals and at bedtime  metoprolol tartrate 25 milliGRAM(s) Oral two times a day  oxyCODONE    IR 5 milliGRAM(s) Oral every 4 hours PRN  oxyCODONE    IR 10 milliGRAM(s) Oral every 6 hours PRN  pantoprazole    Tablet 40 milliGRAM(s) Oral daily  polyethylene glycol 3350 17 Gram(s) Oral daily  senna 2 Tablet(s) Oral at bedtime  sodium chloride 0.9% lock flush 3 milliLiter(s) IV Push every 8 hours  tamsulosin 0.4 milliGRAM(s) Oral at bedtime    T(C): 36.7 (06-19-23 @ 05:01), Max: 37.7 (06-18-23 @ 14:58)  HR: 98 (06-19-23 @ 09:02) (64 - 108)  BP: 121/76 (06-19-23 @ 09:02) (100/59 - 158/84)  RR: 16 (06-19-23 @ 09:02) (15 - 18)  SpO2: 92% (06-19-23 @ 09:02) (92% - 94%)  Wt(kg): --  HEENT: NCAT, EOMI, PERRLA  NECK: No JVD, No carotid bruits B/L, +2 Carotid pulses B/L  PULM:  CTA B/L No W/R/R  CARD: RRR, +S1, +S2, No M/R/G  ABD: ND, +BS, NT, no masses  EXT: Warm, pedal edema yes/no, pitting/non-pitting  RECTAL: Guaiac negative/positive   NEURO: A & O x 3, no focal neurologic deficits  PULSES:	B	    R	      FEM         	                                            DP                          PT  Right		                                                  Yes/No     Bruit	    Left		                                                  Yes/No     Bruit                                12.8   12.04 )-----------( 176      ( 19 Jun 2023 05:59 )             38.4     06-19    137  |  102  |  13  ----------------------------<  106<H>  4.3   |  26  |  1.01    Ca    8.4      19 Jun 2023 05:59  Mg     2.1     06-19            EKG:					  ASA _____				Mallampati class: _________	            Anginal Class: _________  A/P:        Sedation Plan:   ? None   ? Moderate    ?  Deep    ?  General Anesthesia   Patient Is Suitable Candidate For Sedation?     ? Yes   ? No   ? Not Applicable     Risks & benefits of procedure and sedation and risks and benefits for the alternative therapy have been explained to the patient in layman’s terms including but not limited to: allergic reaction, bleeding, infection, arrhythmia, respiratory compromise, renal and vascular compromise, limb damage, MI, CVA, emergent CABG/Vascular Surgery and death. Informed consent obtained and in chart. Interventional Cardiology PA Precath Note      HPI:72 YO M, former smoker, PMHx HTN, HLD, BPH, Afib (on Eliquis at home) who was s/p cardiac cath 5/20/23 that revealed dLM - 60%, ostial/prox LAD 80%, mid LAD 65% (severely calcified), ostial LCX 80%, mid LCX 65%, prox RCA 40%, distal RCA 30%, %  w/left-right collaterals, now s/p MIDCAB ( LIMA-LAD) on 6/16/23 by Dr. Stevenson.  Patient is now referred for staged PCI with Dr. Garcia today, 6/19/23      Operation / Date: MIDCAB (LIMA to LAD) on 6/16/23  Prior Cath Hx:  CATH at [facility] 5/20/23 (Dr Queen)  dLM - 60%, ostial/prox LAD 80%, mid LAD 65% (severely calcified), ostial LCX 80%, mid LCX 65%, prox RCA 40%, distal RCA 30%, %  w/left-right collaterals. Dr. Stevenson consulted for consideration for hybrid procedure.     PAST MEDICAL HISTORY:  Afib     BPH (benign prostatic hyperplasia)     HLD (hyperlipidemia)     HTN (hypertension).     PAST SURGICAL HISTORY:  No significant past surgical history.     FAMILY HISTORY:  No pertinent family history in first degree relatives. No pertinent family history of: coronary disease.    Social History:  · Substance use	No   · Social History (marital status, living situation, occupation, and sexual history)	Former smoker 1ppd x 30 years  Social ETOH use  Denies recreational drug use      ALL: NKDA, NKFA. Denies shellfish/Contrast dye allergy.          CURRENT MEDS:   acetaminophen     Tablet .. 650 milliGRAM(s) Oral every 6 hours PRN  aspirin enteric coated 81 milliGRAM(s) Oral daily  atorvastatin 40 milliGRAM(s) Oral at bedtime  bisacodyl 5 milliGRAM(s) Oral every 12 hours PRN  bisacodyl Suppository 10 milliGRAM(s) Rectal once  chlorhexidine 2% Cloths 1 Application(s) Topical daily  clopidogrel Tablet 75 milliGRAM(s) Oral daily - started  clopidogrel Tablet 300 milliGRAM(s) Oral once  dextrose 5%. 1000 milliLiter(s) IV Continuous <Continuous>  dextrose 5%. 1000 milliLiter(s) IV Continuous <Continuous>  dextrose 50% Injectable 25 Gram(s) IV Push once  dextrose 50% Injectable 12.5 Gram(s) IV Push once  dextrose 50% Injectable 25 Gram(s) IV Push once  dextrose Oral Gel 15 Gram(s) Oral once PRN  furosemide    Tablet 20 milliGRAM(s) Oral daily  glucagon  Injectable 1 milliGRAM(s) IntraMuscular once  heparin   Injectable 5000 Unit(s) SubCutaneous every 8 hours  insulin lispro (ADMELOG) corrective regimen sliding scale   SubCutaneous Before meals and at bedtime  metoprolol tartrate 25 milliGRAM(s) Oral two times a day  oxyCODONE    IR 5 milliGRAM(s) Oral every 4 hours PRN  oxyCODONE    IR 10 milliGRAM(s) Oral every 6 hours PRN  pantoprazole    Tablet 40 milliGRAM(s) Oral daily  polyethylene glycol 3350 17 Gram(s) Oral daily  senna 2 Tablet(s) Oral at bedtime  sodium chloride 0.9% lock flush 3 milliLiter(s) IV Push every 8 hours  tamsulosin 0.4 milliGRAM(s) Oral at bedtime    T(C): 36.7 (06-19-23 @ 05:01), Max: 37.7 (06-18-23 @ 14:58)  HR: 98 (06-19-23 @ 09:02) (64 - 108)  BP: 121/76 (06-19-23 @ 09:02) (100/59 - 158/84)  RR: 16 (06-19-23 @ 09:02) (15 - 18)  SpO2: 92% (06-19-23 @ 09:02) (92% - 94%)    HEENT: NCAT, EOMI, PERRLA  NECK: No JVD, No carotid bruits B/L, +2 Carotid pulses B/L  PULM: mild b/b cracles  CARD: irreg irreg  No M/R/G  ABD: obese, ND, +BS, NT  EXT: Warm, no pedal edema  NEURO: A & O x 3, no focal neurologic deficits  PULSES:	B 2+ b/l   radial 2+ b/l   Right	 2+ No     Bruit	    Left	2+ No     Bruit                                12.8   12.04 )-----------( 176      ( 19 Jun 2023 05:59 )             38.4     06-19    137  |  102  |  13  ----------------------------<  106<H>  4.3   |  26  |  1.01    Ca    8.4      19 Jun 2023 05:59  Mg     2.1     06-19      			  ASA _III__			Mallampati class: III  Angina I     A/P:   72 YO M, former smoker, PMHx HTN, HLD, BPH, Afib (on Eliquis at home) who was s/p cardiac cath 5/20/23 that revealed dLM - 60%, ostial/prox LAD 80%, mid LAD 65% (severely calcified), ostial LCX 80%, mid LCX 65%, prox RCA 40%, distal RCA 30%, %  w/left-right collaterals, now s/p MIDCAB ( LIMA-LAD) on 6/16/23 by Dr. Stevenson.  Patient is now referred for staged PCI with Dr. Garcia today, 6/19/23    on plavix since 6/16/ post -op , total 300mg received since - will load additional 300mg ( for total 600mg)  for staged PCI today  already on asa 81  consented   pre-cath IVF deferred as d/w primary team - pt is on lasix    Sedation Plan: Moderate  Patient Is Suitable Candidate For Sedation?  Yes  Risks & benefits of procedure and sedation and risks and benefits for the alternative therapy have been explained to the patient in layman’s terms including but not limited to: allergic reaction, bleeding, infection, arrhythmia, respiratory compromise, renal and vascular compromise, limb damage, MI, CVA, emergent CABG/Vascular Surgery and death. Informed consent obtained and in chart.

## 2023-06-19 NOTE — DISCHARGE NOTE PROVIDER - CARE PROVIDER_API CALL
Mk Stevenson  Thoracic and Cardiac Surgery  [address]  [place] [address]  Phone: [phone]  Fax: [phone]  Follow Up Time:     Nigel Queen  Cardiovascular Disease  [address] [facility], [facility], [address]  [place] [address]  Phone: [phone]  Fax: [phone]  Follow Up Time:

## 2023-06-19 NOTE — DISCHARGE NOTE PROVIDER - ATTENDING ATTESTATION STATEMENT
FOUR TIMES DAILY AS NEEDED 360 capsule 3    warfarin (COUMADIN) 5 MG tablet Take 5 mg by mouth daily or as directed by [facility] Coumadin Service [phone]      Respiratory Therapy Supplies (NEBULIZER/TUBING/MOUTHPIECE) KIT 1 kit by Does not apply route 3 times daily 2 kit 5    flavoxate (URISPAS) 100 MG tablet TAKE 1 TABLET BY MOUTH THREE TIMES DAILY AS NEEDED FOR URINARY SPASM 90 tablet 0    atropine-PHENobarbital-scopolamine-hyoscyamine (DONNATAL) 16.2 MG per tablet TAKE 1 TABLET BY MOUTH EVERY 6 HOURS AS NEEDED 60 tablet 1    aspirin 81 MG tablet   Take 81 mg by mouth daily       misoprostol (CYTOTEC) 200 MCG tablet Take 200 mcg by mouth 2 times daily.  albuterol (ACCUNEB) 1.25 MG/3ML nebulizer solution Inhale 1 ampule into the lungs every 8 hours.  albuterol (PROAIR HFA) 108 (90 BASE) MCG/ACT inhaler Inhale 2 puffs into the lungs every 6 hours as needed. No current facility-administered medications for this visit. Allergies   Allergen Reactions    Atrovent Nasal Spray [Ipratropium] Other (See Comments)     Chest tightness    Morphine Other (See Comments)     \"makes me feel funny\"      Nitroglycerin Other (See Comments)     Severe hypotension (went from 468 to 66 systolic)    Sulfa Antibiotics Rash    Vicodin [Hydrocodone-Acetaminophen] Rash       Family History   Problem Relation Age of Onset    High Blood Pressure Mother     Dementia Mother     Cancer Father      Pancreatic Ca       Social History     Social History    Marital status:      Spouse name: N/A    Number of children: N/A    Years of education: N/A     Occupational History    Not on file.      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 Ejection fraction is visually   estimated to be 40-45%.  Benjamen Lipps is septal hypokinesis (secondary to Pacing)   Trivial mitral & tricuspid regurgitation is present.   The left atrial size is normal.   Pacer / ICD wire is visualized in the right ventricle.   There appears to be normal right ventricular size and function. Lab Results   Component Value Date    WBC 6.5 11/08/2017    HGB 15.2 11/08/2017    HCT 44.9 11/08/2017    MCV 91.6 11/08/2017     11/08/2017       Lab Results   Component Value Date    BNP 22.8 08/16/2012       Lab Results   Component Value Date    CREATININE 0.8 (L) 11/08/2017    BUN 9 11/08/2017     11/08/2017    K 3.7 11/08/2017     11/08/2017    CO2 22 11/08/2017         Assessment/Plan:61y.o. year old male presents for follow up. Bronchiolitis obliterans- Will obtain PFTs to compare to 2014. Continue albuterol nebulizers and Pro-Air. Lung nodules- Had tree-in-bud opacities in the left upper lobe in January 2017. He has not yet had his follow-up CT scan. I have reminded him to do so. He will return for follow-up in 4 months.       Claude Lewis MD  [facility] I have personally seen and examined the patient. I have collaborated with and supervised the

## 2023-06-20 ENCOUNTER — TRANSCRIPTION ENCOUNTER (OUTPATIENT)
Age: 73
End: 2023-06-20

## 2023-06-20 VITALS
HEART RATE: 80 BPM | RESPIRATION RATE: 17 BRPM | OXYGEN SATURATION: 95 % | DIASTOLIC BLOOD PRESSURE: 74 MMHG | SYSTOLIC BLOOD PRESSURE: 143 MMHG

## 2023-06-20 LAB
ANION GAP SERPL CALC-SCNC: 10 MMOL/L — SIGNIFICANT CHANGE UP (ref 5–17)
BUN SERPL-MCNC: 15 MG/DL — SIGNIFICANT CHANGE UP (ref 7–23)
CALCIUM SERPL-MCNC: 8 MG/DL — LOW (ref 8.4–10.5)
CHLORIDE SERPL-SCNC: 103 MMOL/L — SIGNIFICANT CHANGE UP (ref 96–108)
CO2 SERPL-SCNC: 25 MMOL/L — SIGNIFICANT CHANGE UP (ref 22–31)
CREAT SERPL-MCNC: 0.93 MG/DL — SIGNIFICANT CHANGE UP (ref 0.5–1.3)
EGFR: 86 ML/MIN/1.73M2 — SIGNIFICANT CHANGE UP
GLUCOSE BLDC GLUCOMTR-MCNC: 107 MG/DL — HIGH (ref 70–99)
GLUCOSE SERPL-MCNC: 100 MG/DL — HIGH (ref 70–99)
HCT VFR BLD CALC: 36.5 % — LOW (ref 39–50)
HGB BLD-MCNC: 12.3 G/DL — LOW (ref 13–17)
MAGNESIUM SERPL-MCNC: 2.1 MG/DL — SIGNIFICANT CHANGE UP (ref 1.6–2.6)
MCHC RBC-ENTMCNC: 32.8 PG — SIGNIFICANT CHANGE UP (ref 27–34)
MCHC RBC-ENTMCNC: 33.7 GM/DL — SIGNIFICANT CHANGE UP (ref 32–36)
MCV RBC AUTO: 97.3 FL — SIGNIFICANT CHANGE UP (ref 80–100)
NRBC # BLD: 0 /100 WBCS — SIGNIFICANT CHANGE UP (ref 0–0)
PLATELET # BLD AUTO: 203 K/UL — SIGNIFICANT CHANGE UP (ref 150–400)
POTASSIUM SERPL-MCNC: SIGNIFICANT CHANGE UP (ref 3.5–5.3)
POTASSIUM SERPL-SCNC: SIGNIFICANT CHANGE UP (ref 3.5–5.3)
RBC # BLD: 3.75 M/UL — LOW (ref 4.2–5.8)
RBC # FLD: 12.5 % — SIGNIFICANT CHANGE UP (ref 10.3–14.5)
SODIUM SERPL-SCNC: 138 MMOL/L — SIGNIFICANT CHANGE UP (ref 135–145)
WBC # BLD: 9.96 K/UL — SIGNIFICANT CHANGE UP (ref 3.8–10.5)
WBC # FLD AUTO: 9.96 K/UL — SIGNIFICANT CHANGE UP (ref 3.8–10.5)

## 2023-06-20 PROCEDURE — C1724: CPT

## 2023-06-20 PROCEDURE — C1889: CPT

## 2023-06-20 PROCEDURE — C1769: CPT

## 2023-06-20 PROCEDURE — P9045: CPT

## 2023-06-20 PROCEDURE — 85347 COAGULATION TIME ACTIVATED: CPT

## 2023-06-20 PROCEDURE — 85730 THROMBOPLASTIN TIME PARTIAL: CPT

## 2023-06-20 PROCEDURE — 86803 HEPATITIS C AB TEST: CPT

## 2023-06-20 PROCEDURE — 85025 COMPLETE CBC W/AUTO DIFF WBC: CPT

## 2023-06-20 PROCEDURE — 83036 HEMOGLOBIN GLYCOSYLATED A1C: CPT

## 2023-06-20 PROCEDURE — 85027 COMPLETE CBC AUTOMATED: CPT

## 2023-06-20 PROCEDURE — 83880 ASSAY OF NATRIURETIC PEPTIDE: CPT

## 2023-06-20 PROCEDURE — 84484 ASSAY OF TROPONIN QUANT: CPT

## 2023-06-20 PROCEDURE — C1760: CPT

## 2023-06-20 PROCEDURE — C1887: CPT

## 2023-06-20 PROCEDURE — 84100 ASSAY OF PHOSPHORUS: CPT

## 2023-06-20 PROCEDURE — 71045 X-RAY EXAM CHEST 1 VIEW: CPT

## 2023-06-20 PROCEDURE — 71045 X-RAY EXAM CHEST 1 VIEW: CPT | Mod: 26

## 2023-06-20 PROCEDURE — 82803 BLOOD GASES ANY COMBINATION: CPT

## 2023-06-20 PROCEDURE — C1894: CPT

## 2023-06-20 PROCEDURE — 80048 BASIC METABOLIC PNL TOTAL CA: CPT

## 2023-06-20 PROCEDURE — 86850 RBC ANTIBODY SCREEN: CPT

## 2023-06-20 PROCEDURE — 86901 BLOOD TYPING SEROLOGIC RH(D): CPT

## 2023-06-20 PROCEDURE — 81003 URINALYSIS AUTO W/O SCOPE: CPT

## 2023-06-20 PROCEDURE — 84443 ASSAY THYROID STIM HORMONE: CPT

## 2023-06-20 PROCEDURE — 86891 AUTOLOGOUS BLOOD OP SALVAGE: CPT

## 2023-06-20 PROCEDURE — 86900 BLOOD TYPING SEROLOGIC ABO: CPT

## 2023-06-20 PROCEDURE — 93005 ELECTROCARDIOGRAM TRACING: CPT

## 2023-06-20 PROCEDURE — 80053 COMPREHEN METABOLIC PANEL: CPT

## 2023-06-20 PROCEDURE — C1751: CPT

## 2023-06-20 PROCEDURE — 97161 PT EVAL LOW COMPLEX 20 MIN: CPT

## 2023-06-20 PROCEDURE — C1725: CPT

## 2023-06-20 PROCEDURE — 36415 COLL VENOUS BLD VENIPUNCTURE: CPT

## 2023-06-20 PROCEDURE — 93880 EXTRACRANIAL BILAT STUDY: CPT

## 2023-06-20 PROCEDURE — 83735 ASSAY OF MAGNESIUM: CPT

## 2023-06-20 PROCEDURE — 80061 LIPID PANEL: CPT

## 2023-06-20 PROCEDURE — 94150 VITAL CAPACITY TEST: CPT

## 2023-06-20 PROCEDURE — 82962 GLUCOSE BLOOD TEST: CPT

## 2023-06-20 PROCEDURE — 71046 X-RAY EXAM CHEST 2 VIEWS: CPT

## 2023-06-20 PROCEDURE — 86923 COMPATIBILITY TEST ELECTRIC: CPT

## 2023-06-20 PROCEDURE — 84132 ASSAY OF SERUM POTASSIUM: CPT

## 2023-06-20 PROCEDURE — 93306 TTE W/DOPPLER COMPLETE: CPT

## 2023-06-20 PROCEDURE — S2900: CPT

## 2023-06-20 PROCEDURE — 82330 ASSAY OF CALCIUM: CPT

## 2023-06-20 PROCEDURE — C1753: CPT

## 2023-06-20 PROCEDURE — C1874: CPT

## 2023-06-20 PROCEDURE — 85610 PROTHROMBIN TIME: CPT

## 2023-06-20 PROCEDURE — 84295 ASSAY OF SERUM SODIUM: CPT

## 2023-06-20 RX ORDER — FELODIPINE 5 MG/1
1 TABLET, FILM COATED, EXTENDED RELEASE ORAL
Refills: 0 | DISCHARGE

## 2023-06-20 RX ORDER — ATORVASTATIN CALCIUM 80 MG/1
1 TABLET, FILM COATED ORAL
Qty: 30 | Refills: 0
Start: 2023-06-20 | End: 2023-07-19

## 2023-06-20 RX ORDER — ATORVASTATIN CALCIUM 80 MG/1
1 TABLET, FILM COATED ORAL
Refills: 0 | DISCHARGE

## 2023-06-20 RX ORDER — METOPROLOL TARTRATE 50 MG
1 TABLET ORAL
Refills: 0 | DISCHARGE

## 2023-06-20 RX ORDER — FUROSEMIDE 40 MG
1 TABLET ORAL
Qty: 30 | Refills: 0
Start: 2023-06-20 | End: 2023-07-19

## 2023-06-20 RX ORDER — OXYCODONE HYDROCHLORIDE 5 MG/1
1 TABLET ORAL
Qty: 16 | Refills: 0
Start: 2023-06-20 | End: 2023-06-23

## 2023-06-20 RX ORDER — CLOPIDOGREL BISULFATE 75 MG/1
1 TABLET, FILM COATED ORAL
Qty: 30 | Refills: 0
Start: 2023-06-20 | End: 2023-07-19

## 2023-06-20 RX ORDER — PANTOPRAZOLE SODIUM 20 MG/1
1 TABLET, DELAYED RELEASE ORAL
Qty: 30 | Refills: 0
Start: 2023-06-20 | End: 2023-07-19

## 2023-06-20 RX ORDER — LOSARTAN POTASSIUM 100 MG/1
1 TABLET, FILM COATED ORAL
Refills: 0 | DISCHARGE

## 2023-06-20 RX ORDER — ASPIRIN/CALCIUM CARB/MAGNESIUM 324 MG
1 TABLET ORAL
Qty: 30 | Refills: 0
Start: 2023-06-20 | End: 2023-07-19

## 2023-06-20 RX ORDER — METOPROLOL TARTRATE 50 MG
1 TABLET ORAL
Qty: 60 | Refills: 0
Start: 2023-06-20 | End: 2023-07-19

## 2023-06-20 RX ORDER — POLYETHYLENE GLYCOL 3350 17 G/17G
17 POWDER, FOR SOLUTION ORAL
Qty: 1 | Refills: 0
Start: 2023-06-20 | End: 2023-07-19

## 2023-06-20 RX ORDER — ACETAMINOPHEN 500 MG
2 TABLET ORAL
Qty: 240 | Refills: 0
Start: 2023-06-20 | End: 2023-07-19

## 2023-06-20 RX ORDER — POTASSIUM CHLORIDE 20 MEQ
1 PACKET (EA) ORAL
Qty: 30 | Refills: 0
Start: 2023-06-20 | End: 2023-07-19

## 2023-06-20 RX ORDER — APIXABAN 2.5 MG/1
1 TABLET, FILM COATED ORAL
Refills: 0 | DISCHARGE

## 2023-06-20 RX ADMIN — HEPARIN SODIUM 5000 UNIT(S): 5000 INJECTION INTRAVENOUS; SUBCUTANEOUS at 05:48

## 2023-06-20 RX ADMIN — Medication 20 MILLIGRAM(S): at 05:49

## 2023-06-20 RX ADMIN — SODIUM CHLORIDE 3 MILLILITER(S): 9 INJECTION INTRAMUSCULAR; INTRAVENOUS; SUBCUTANEOUS at 05:45

## 2023-06-20 RX ADMIN — Medication 25 MILLIGRAM(S): at 05:49

## 2023-06-20 RX ADMIN — Medication 650 MILLIGRAM(S): at 00:00

## 2023-06-20 NOTE — DISCHARGE NOTE NURSING/CASE MANAGEMENT/SOCIAL WORK - PATIENT PORTAL LINK FT
(120)  Patient seen and examined, resting in bed, comfortable.   Reports no change in her neurologic status.   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   Has met with Dr [facility],  has not decided if she wants to consider surgical intervention.  Neurological on exam there is no progression of her symptoms.  Will continue to closely monitor,  Dr George discussing situation and options with patient and family. You can access the FollowMyHealth Patient Portal offered by [facility] by registering at the following website: [email or web address]. By joining [employer]’s FollowMyHealth portal, you will also be able to view your health information using other applications (apps) compatible with our system.

## 2023-06-20 NOTE — DISCHARGE NOTE NURSING/CASE MANAGEMENT/SOCIAL WORK - NSDCFUADDAPPT_GEN_ALL_CORE_FT
The office will call you with follow-up appointments. If you do not receive a call by tomorrow, please call the office.

## 2023-06-20 NOTE — DISCHARGE NOTE NURSING/CASE MANAGEMENT/SOCIAL WORK - NSDPACMPNY_GEN_ALL_CORE
Non-compliant report chart audits. Chart review completed 04/21/2022 for HM test overdue.    Care Everywhere and media, updates requested and reviewed.      HM updated with external mammogram                
Family

## 2023-06-20 NOTE — DISCHARGE NOTE NURSING/CASE MANAGEMENT/SOCIAL WORK - NSDCPEFALRISK_GEN_ALL_CORE
For information on Fall & Injury Prevention, visit: [email or web address] OR  [email or web address] OR  https://www.cdc.gov/steadi/patient.html

## 2023-06-21 ENCOUNTER — APPOINTMENT (OUTPATIENT)
Dept: CARE COORDINATION | Facility: HOME HEALTH | Age: 73
End: 2023-06-21
Payer: MEDICARE

## 2023-06-21 DIAGNOSIS — Z95.5 PRESENCE OF CORONARY ANGIOPLASTY IMPLANT AND GRAFT: ICD-10-CM

## 2023-06-21 DIAGNOSIS — Z09 ENCOUNTER FOR FOLLOW-UP EXAMINATION AFTER COMPLETED TREATMENT FOR CONDITIONS OTHER THAN MALIGNANT NEOPLASM: ICD-10-CM

## 2023-06-21 DIAGNOSIS — Z95.1 PRESENCE OF AORTOCORONARY BYPASS GRAFT: ICD-10-CM

## 2023-06-21 PROCEDURE — 99024 POSTOP FOLLOW-UP VISIT: CPT

## 2023-06-22 RX ORDER — LOSARTAN POTASSIUM 100 MG/1
100 TABLET, FILM COATED ORAL
Refills: 0 | Status: COMPLETED | COMMUNITY
End: 2023-06-22

## 2023-06-22 RX ORDER — FELODIPINE 5 MG/1
5 TABLET, EXTENDED RELEASE ORAL
Refills: 0 | Status: COMPLETED | COMMUNITY
End: 2023-06-22

## 2023-06-22 RX ORDER — APIXABAN 2.5 MG/1
2.5 TABLET, FILM COATED ORAL
Refills: 0 | Status: COMPLETED | COMMUNITY
End: 2023-06-22

## 2023-06-22 RX ORDER — METOPROLOL SUCCINATE 50 MG/1
50 TABLET, EXTENDED RELEASE ORAL
Refills: 0 | Status: COMPLETED | COMMUNITY
End: 2023-06-22

## 2023-06-25 RX ORDER — ATORVASTATIN CALCIUM 40 MG/1
40 TABLET, FILM COATED ORAL
Qty: 30 | Refills: 3 | Status: ACTIVE | COMMUNITY

## 2023-06-25 RX ORDER — POTASSIUM CHLORIDE 750 MG/1
10 TABLET, FILM COATED, EXTENDED RELEASE ORAL
Qty: 30 | Refills: 0 | Status: DISCONTINUED | COMMUNITY
Start: 2023-06-20

## 2023-06-25 RX ORDER — PANTOPRAZOLE SODIUM 40 MG/1
40 TABLET, DELAYED RELEASE ORAL
Qty: 30 | Refills: 2 | Status: ACTIVE | COMMUNITY

## 2023-06-25 RX ORDER — OXYCODONE 5 MG/1
5 TABLET ORAL
Refills: 0 | Status: ACTIVE | COMMUNITY

## 2023-06-25 RX ORDER — APIXABAN 5 MG/1
5 TABLET, FILM COATED ORAL
Qty: 180 | Refills: 0 | Status: DISCONTINUED | COMMUNITY
Start: 2023-06-06

## 2023-06-25 RX ORDER — ASPIRIN 81 MG
81 TABLET, DELAYED RELEASE (ENTERIC COATED) ORAL DAILY
Qty: 30 | Refills: 1 | Status: ACTIVE | COMMUNITY

## 2023-06-25 RX ORDER — TAMSULOSIN HYDROCHLORIDE 0.4 MG/1
0.4 CAPSULE ORAL
Qty: 30 | Refills: 2 | Status: ACTIVE | COMMUNITY

## 2023-06-25 RX ORDER — POTASSIUM CHLORIDE 750 MG/1
10 TABLET, EXTENDED RELEASE ORAL DAILY
Refills: 0 | Status: ACTIVE | COMMUNITY

## 2023-06-25 RX ORDER — CLOPIDOGREL 75 MG/1
75 TABLET, FILM COATED ORAL DAILY
Qty: 30 | Refills: 6 | Status: ACTIVE | COMMUNITY

## 2023-06-25 RX ORDER — FUROSEMIDE 20 MG/1
20 TABLET ORAL DAILY
Qty: 30 | Refills: 0 | Status: ACTIVE | COMMUNITY

## 2023-06-25 RX ORDER — METOPROLOL TARTRATE 25 MG/1
25 TABLET, FILM COATED ORAL
Refills: 0 | Status: ACTIVE | COMMUNITY

## 2023-06-25 NOTE — HISTORY OF PRESENT ILLNESS
[FreeTextEntry1] : [facility]: [place] \par 24-hour discharge follow-up and assessment \par \par Operation Date: MIDCAB (LIMA to LAD) on 6/16/23: Primary Surgeon/Attending MD: Dr. Stevenson \par \par Stefan Ryder is a 72 YO, former smoker, English/ Tagalog-speaking Male PMHx HTN, HLD, BPH, Afib (on Eliquis) who was s/p cardiac cath 5/20/23 that revealed dLM - 60%, ostial/prox LAD 80%, mid LAD 65% (severely calcified), ostial LCX 80%, mid LCX 65%, prox RCA 40%, distal RCA 30%, %  w/left-right collaterals. Dr. Stevenson consulted for consideration for hybrid procedure, deemed a good candidate for intervention.  \par \par On 6/15/23 underwent a MidCAB, LIMA-LAD EF 60-65%. Intraop course uncomplicated. On POD3 Patient underwent cardiac cath with OTIS distal LM and IVUS guided Atherectomy/OTIS LCx, via RFA with Dr. Garcia. Discharged to home on 6/20/2023.  \par \par Post-op incision assessment: L chest incision well-approximated with Dermabond. Haris drain removed w/ tie down x 1 in place; c/d/i no drainage, erythema or edema noted.

## 2023-06-25 NOTE — ASSESSMENT
[FreeTextEntry1] : ASSESSMENT \par \par Patient recovering at home s/p MIDCAB, accompanied by daughter Kari Steel. Reviewed all medications and dosages with patient understanding. Patient has all medications in home and is taking as prescribed. Pain controlled with current medication regimen. No new symptoms, issues or concerns. Reports ambulating around home independently, without the use if assistive device. Denies chest pain, SOB/COATS, nausea/vomiting, constipation/diarrhea. \par \par PLAN OF CARE\par \par  IS use and return demonstration done patient pulling TV of 1500; encouraged IS use 10x q1h to improve circulation and breathing. Discussed IS to allow patient to expectorate. \par \par -Shower let water run over incision use antibacterial soap and pat dry; avoid all creams, ointments or lotions leave open to air. \par \par -Encourage increased ambulation to include outdoors; avoiding extreme temperatures.\par \par -Start daily weights today; call immediately for weight gain >3lbs in a day/5lbs in a week.\par \par \par Follow Up: \par Mk Stevenson Thoracic and Cardiac Surgery: Follow Up Time: 6/27/23 at 10am \par   \par Nigel Queen Cardiovascular Disease: Discussed recommended follow-up time of two weeks.  \par \par \par POST OP PLAN\par \par *Adhere to DASH diet.\par \par *Do not drive or operate machinery, No heavy lifting/straining, Showering allowed, Stairs allowed, Walking - Indoors allowed, Walking - Outdoors allowed \par \par *Walk daily as tolerated and use your incentive spirometer 10 times every hour while you are awake. Walk around the apartment and transition slowly to stairs and outdoors as tolerated, avoid extreme temperatures when outdoors.\par  \par *Please weigh yourself daily. If you notice over a 3 pound weight gain in 3 days, this is a sign you are likely retaining too much fluid. It is imperative you call our right away with unexplained rapid weight gain. \par  \par *Please continue to wear the compression stockings given to you in the hospital at home. This is a way to prevent fluid from building up in your legs. You can remove intermittently for breaks or to wash.\par  \par *No driving or strenuous activity/exercise until cleared by your surgeon. \par  \par *Gently clean your incisions with unscented/antibacterial soap and water, pat dry. You may leave them open to air. \par  \par *Call your doctor if you have shortness of breath, chest pain not relieved by pain medication, dizziness, fever >100.5, or increased redness or drainage from incisions. \par \par \par MIDCAB incision precautions\par \par Do not lift or carry anything heavier than 5 pounds. For example, a gallon of milk weighs 8 pounds.\par \par Keep your arms down as much as possible. Do not put your arms out to the side, behind you, or over your head. Do not let anyone pull your arms to help you move or dress. Do not reach for items.\par \par Do not push or pull anything. Examples include a car door or a vacuum .\par \par Do not drive while you are healing. Your surgeon will tell you when it is safe for you to start driving again.\par

## 2023-06-25 NOTE — PHYSICAL EXAM
[Sclera] : the sclera and conjunctiva were normal [Neck Appearance] : the appearance of the neck was normal [Exaggerated Use Of Accessory Muscles For Inspiration] : no accessory muscle use [Respiration, Rhythm And Depth] : normal respiratory rhythm and effort [Breast Appearance] : normal in appearance [No CVA Tenderness] : no ~M costovertebral angle tenderness [No Spinal Tenderness] : no spinal tenderness [Abnormal Walk] : normal gait [Nail Clubbing] : no clubbing  or cyanosis of the fingernails [Musculoskeletal - Swelling] : no joint swelling seen [Motor Tone] : muscle strength and tone were normal [Skin Color & Pigmentation] : normal skin color and pigmentation [Skin Turgor] : normal skin turgor [] : no rash [Deep Tendon Reflexes (DTR)] : deep tendon reflexes were 2+ and symmetric [Sensation] : the sensory exam was normal to light touch and pinprick [No Focal Deficits] : no focal deficits [Oriented To Time, Place, And Person] : oriented to person, place, and time [Impaired Insight] : insight and judgment were intact [Affect] : the affect was normal [FreeTextEntry1] : TH LIMITED ASSESSMENT

## 2023-06-26 ENCOUNTER — NON-APPOINTMENT (OUTPATIENT)
Age: 73
End: 2023-06-26

## 2023-06-26 DIAGNOSIS — N40.0 BENIGN PROSTATIC HYPERPLASIA WITHOUT LOWER URINARY TRACT SYMPTOMS: ICD-10-CM

## 2023-06-26 DIAGNOSIS — I25.84 CORONARY ATHEROSCLEROSIS DUE TO CALCIFIED CORONARY LESION: ICD-10-CM

## 2023-06-26 DIAGNOSIS — I25.10 ATHEROSCLEROTIC HEART DISEASE OF NATIVE CORONARY ARTERY WITHOUT ANGINA PECTORIS: ICD-10-CM

## 2023-06-26 DIAGNOSIS — I48.20 CHRONIC ATRIAL FIBRILLATION, UNSPECIFIED: ICD-10-CM

## 2023-06-26 DIAGNOSIS — Z79.01 LONG TERM (CURRENT) USE OF ANTICOAGULANTS: ICD-10-CM

## 2023-06-26 DIAGNOSIS — E78.5 HYPERLIPIDEMIA, UNSPECIFIED: ICD-10-CM

## 2023-06-26 DIAGNOSIS — I10 ESSENTIAL (PRIMARY) HYPERTENSION: ICD-10-CM

## 2023-06-26 DIAGNOSIS — Z98.61 CORONARY ANGIOPLASTY STATUS: ICD-10-CM

## 2023-06-26 DIAGNOSIS — R00.1 BRADYCARDIA, UNSPECIFIED: ICD-10-CM

## 2023-06-26 DIAGNOSIS — Z87.891 PERSONAL HISTORY OF NICOTINE DEPENDENCE: ICD-10-CM

## 2023-06-27 ENCOUNTER — OUTPATIENT (OUTPATIENT)
Dept: OUTPATIENT SERVICES | Facility: HOSPITAL | Age: 73
LOS: 1 days | End: 2023-06-27
Payer: MEDICARE

## 2023-06-27 ENCOUNTER — APPOINTMENT (OUTPATIENT)
Dept: CARDIOTHORACIC SURGERY | Facility: CLINIC | Age: 73
End: 2023-06-27
Payer: MEDICARE

## 2023-06-27 VITALS
SYSTOLIC BLOOD PRESSURE: 110 MMHG | WEIGHT: 186 LBS | BODY MASS INDEX: 29.19 KG/M2 | OXYGEN SATURATION: 96 % | HEART RATE: 64 BPM | TEMPERATURE: 97.4 F | RESPIRATION RATE: 17 BRPM | HEIGHT: 67 IN | DIASTOLIC BLOOD PRESSURE: 61 MMHG

## 2023-06-27 PROCEDURE — 71046 X-RAY EXAM CHEST 2 VIEWS: CPT

## 2023-06-27 PROCEDURE — 99024 POSTOP FOLLOW-UP VISIT: CPT

## 2023-06-27 PROCEDURE — 71046 X-RAY EXAM CHEST 2 VIEWS: CPT | Mod: 26

## 2023-06-29 LAB — ISTAT ACTK (ACTIVATED CLOTTING TIME KAOLIN): 395 SEC — HIGH (ref 74–137)

## 2023-06-29 NOTE — REASON FOR VISIT
[Family Member] : family member [de-identified] : Robotic assisted MIDCAB x 1 (LIMA->LAD) [de-identified] : 6/16/23 [de-identified] : Intraop uncomplicated. Extubated in OR. 6/19 sp OTIS->LM ->LCFX. Discharged home on 6/20. Patient presents for post op visit accompanied by his dtr. He appears generally well, denies c/o chest pain, sob/hannah, fever, lower extremity edema or palpitations.\par chest xray today wnl, no effusion

## 2023-06-29 NOTE — PHYSICAL EXAM
[] : no respiratory distress [Auscultation Breath Sounds / Voice Sounds] : lungs were clear to auscultation bilaterally [Heart Rate And Rhythm] : heart rate was normal and rhythm regular [Heart Sounds] : normal S1 and S2 [Heart Sounds Gallop] : no gallops [Murmurs] : no murmurs [Heart Sounds Pericardial Friction Rub] : no pericardial rub [Clean] : clean [Dry] : dry [Healing Well] : healing well [No Edema] : no edema [FreeTextEntry2] : left ant mini thoracotomy

## 2023-06-29 NOTE — CONSULT LETTER
[Please see my note below.] : Please see my note below. [Sincerely,] : Sincerely, [Dear  ___] : Dear  [unfilled], [FreeTextEntry2] : Hardik Echavarria MD\par [address] [place] [address], [facility]\[place] [address]\par  [FreeTextEntry1] : SOO CORREIA  was seen today for a post operative visit. He is doing well status post Robotic assisted MIDCAB x 1 (LIMA->LAD) on June 16, 2023 and s/p OTIS-> LM->LCX on 6/19/23 for completion of hybrid procedure.   We have asked that the patient followup in your office. We have discharged him from our service today.  Enclosed is a copy of the operative report. Please contact our office for any questions or concerns at [phone].\par \par Thank you for allowing us to participate in this patients care.\par  [FreeTextEntry3] : Mk Stevenson MD, FRCS\par \par [facility] \par Department of Cardiothoracic  Surgery \par Director of Robotic Cardiac Surgery\par Professor of Cardiovascular & Thoracic Surgery\par [employer] \par \par [facility]\par

## 2023-06-29 NOTE — DISCUSSION/SUMMARY
[Doing Well] : is doing well [1] : 1 [Remove Sutures/Staples] : removed sutures/staples [de-identified] : CT suture removed [FreeTextEntry1] : Plan: \par Continue current medication regimen.\par Continue to increase activity and walk daily as tolerated. Continue to use incentive spirometer. \par No driving or strenuous activity for 4 weeks after surgery. Avoid lifting >10 to 15 lbs.\par Call MD if you experience fever, fatigue, dizziness, confusion, syncope, shortness of breath, chest pain not relieved with analgesics, increased redness/drainage from incision.\par Follow up with Dr. Echavarria\par stop Aspirin, restart Elquis 5mg bid (preop PAF)\par continue Plavix x 6 months s/t PCI\par DC lasix/kdur\par DC from CTS service\par \par

## 2023-06-29 NOTE — END OF VISIT
[FreeTextEntry3] : I, SAMI DOWNEY , am scribing for and in the presence of NASEEM MARTINEZ the following sections: History of present illness, past Medical/family/surgical/family/social history, review of systems, vital signs, physical exam and disposition.\par I personally performed the services described in the documentation, reviewed the documentation recorded by the scribe in my presence and it accurately and completely records my words and actions.\par

## 2024-12-16 ENCOUNTER — NON-APPOINTMENT (OUTPATIENT)
Age: 74
End: 2024-12-16

## 2024-12-16 ENCOUNTER — APPOINTMENT (OUTPATIENT)
Dept: ELECTROPHYSIOLOGY | Facility: CLINIC | Age: 74
End: 2024-12-16
Payer: MEDICARE

## 2024-12-16 VITALS
BODY MASS INDEX: 31.55 KG/M2 | HEIGHT: 67 IN | WEIGHT: 201 LBS | OXYGEN SATURATION: 99 % | DIASTOLIC BLOOD PRESSURE: 82 MMHG | HEART RATE: 67 BPM | SYSTOLIC BLOOD PRESSURE: 121 MMHG

## 2024-12-16 DIAGNOSIS — I25.10 ATHEROSCLEROTIC HEART DISEASE OF NATIVE CORONARY ARTERY W/OUT ANGINA PECTORIS: ICD-10-CM

## 2024-12-16 DIAGNOSIS — I49.5 SICK SINUS SYNDROME: ICD-10-CM

## 2024-12-16 PROCEDURE — 93000 ELECTROCARDIOGRAM COMPLETE: CPT

## 2024-12-16 PROCEDURE — 99203 OFFICE O/P NEW LOW 30 MIN: CPT | Mod: 25

## 2024-12-16 RX ORDER — LOSARTAN POTASSIUM 25 MG/1
25 TABLET, FILM COATED ORAL DAILY
Refills: 0 | Status: ACTIVE | COMMUNITY

## 2024-12-16 RX ORDER — APIXABAN 5 MG/1
5 TABLET, FILM COATED ORAL
Qty: 180 | Refills: 3 | Status: ACTIVE | COMMUNITY

## 2024-12-16 RX ORDER — HYDROCHLOROTHIAZIDE 12.5 MG/1
12.5 TABLET ORAL DAILY
Refills: 0 | Status: ACTIVE | COMMUNITY

## 2025-01-22 NOTE — PHYSICAL THERAPY INITIAL EVALUATION ADULT - LIVES WITH, PROFILE
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   X Ray L foot 11/21 per report shows Hallux soft tissue swelling with focal plantar ulceration. IP joint with eroded articular margins indeterminate for infection/septic versus inflammatory arthritic process.    Plan:  -Podiatry consulted, will f/u recs on if further imaging is necessary to evaluate for infection/septic vs. inflammatory arthritis.
children

## (undated) DEVICE — Device

## (undated) DEVICE — SUT PROLENE 7-0 24" BV175-6

## (undated) DEVICE — SUT VICRYL 2-0 27" CT-1

## (undated) DEVICE — SUCTION CATH ARGYLE WHISTLE TIP 14FR STRAIGHT PACKED

## (undated) DEVICE — XI SEAL UNIV 5- 8 MM

## (undated) DEVICE — DRAIN RESERVOIR FOR JACKSON PRATT 100CC CARDINAL

## (undated) DEVICE — CATH NG SALEM SUMP 16FR

## (undated) DEVICE — ELCTR STRYKER NEPTUNE SMOKE EVACUATION PENCIL (GREEN)

## (undated) DEVICE — D HELP - CLEARVIEW CLEARIFY SYSTEM

## (undated) DEVICE — CHEST DRAIN PLEUR-EVAC DRY/WET ADULT-PEDS SINGLE (QUICK)

## (undated) DEVICE — SYNOVIS VASCULAR PROBE 1.5MM 15CM

## (undated) DEVICE — ELCTR STRYKER EXTENSION SUCTION TIP 125MM

## (undated) DEVICE — LUBRICANT INST ELECTROLUBE Z SOLUTION

## (undated) DEVICE — FOLEY TRAY 16FR 5CC LF LUBRISIL ADVANCE TEMP CLOSED

## (undated) DEVICE — SUT DOUBLE 6 WIRE STERNAL

## (undated) DEVICE — TROCAR COVIDIEN VERSAPORT BLADELESS OPTICAL 5MM STANDARD

## (undated) DEVICE — XI DRAPE ARM

## (undated) DEVICE — XI DRAPE COLUMN

## (undated) DEVICE — SUT ETHIBOND 0 18" TIES

## (undated) DEVICE — ELCTR BOVIE TIP BLADE INSULATED 4" EDGE

## (undated) DEVICE — KIT APPLICATOR SPRAY FOR HARVEST SYSTEM

## (undated) DEVICE — PACK PROC CV DRAPE

## (undated) DEVICE — LOOP VASC SILICONE ELASTOMER W NDL 18G

## (undated) DEVICE — SUT VICRYL 1 36" CTX UNDYED

## (undated) DEVICE — XI TIP COVER

## (undated) DEVICE — SUT VICRYL 0 27" CT-3

## (undated) DEVICE — DRSG RESTRAINT LIMB WRAP AROUND

## (undated) DEVICE — CATH EPI  3 EYE CLOSED END 20G

## (undated) DEVICE — CATH IV SAFE BC 24G X 0.75" (YELLOW)

## (undated) DEVICE — ELCTR BOVIE TIP NEEDLE INSULATED 6.5" EDGE

## (undated) DEVICE — DRAPE PROBE COVER 5" X 96"

## (undated) DEVICE — CATH TRIOX OXIMETRY 8F 3 LUMENS

## (undated) DEVICE — SUT STAINLESS STEEL 7 4-18" CCS

## (undated) DEVICE — PACK OPEN HEART LNX

## (undated) DEVICE — NDL HYPO SAFE 22G X 1.5" (BLACK)

## (undated) DEVICE — STABILIZR OCTOPUS NUVO

## (undated) DEVICE — DVC ASCOPE 4 SNGL USE SLIM

## (undated) DEVICE — DRAPE FLUID WARMER 44 X 66"

## (undated) DEVICE — SYR LUER LOK 30CC

## (undated) DEVICE — DRSG BIOPATCH DISK W CHG 1" W 4.0MM HOLE

## (undated) DEVICE — DRAPE IOBAN 33" X 23"

## (undated) DEVICE — SUT MONOCRYL 4-0 27" PS-2 UNDYED

## (undated) DEVICE — SUT VICRYL 0 27" CT

## (undated) DEVICE — POSITIONER FOAM EGG CRATE ULNAR 2PCS (PINK)

## (undated) DEVICE — SUMP INTRACARDIAC/PERICARDIAL 20FR 1/4" ADULT

## (undated) DEVICE — TUBING SMOKE EVAC 3/8" X 10FT FOR NEPTUNE

## (undated) DEVICE — PREP SCRUB BRUSH W CHG 4%

## (undated) DEVICE — DRSG ALLEVYN LIFE 6 X 6 (PINK)

## (undated) DEVICE — TUBING STRYKER PNEUMOCLEAR HIGH FLOW HEATED

## (undated) DEVICE — PACK PROCEDURE HARVEST SMARTPREP APC-60

## (undated) DEVICE — STABILIZER W STABLESOFT II LS

## (undated) DEVICE — SUT STAINLESS STEEL 6 4-18" CCS

## (undated) DEVICE — DRSG TRACH DRAINAGE 4X4

## (undated) DEVICE — CATH CV TRAY INSR ST UNIV

## (undated) DEVICE — DRSG MEPILEX 10 X 25CM (4 X 10") AG

## (undated) DEVICE — DRAPE TOWEL WHITE 17" X 24"

## (undated) DEVICE — BLOWER MISTER AXIUS WITH IV SET

## (undated) DEVICE — DRSG DERMABOND 0.7ML

## (undated) DEVICE — SUT NUROLON 1 18" OS-8 (POP-OFF)

## (undated) DEVICE — SUCTION CATH AIRLIFE CONTROL VALVE TRIFLO 14FR

## (undated) DEVICE — ELCTR BOVIE TIP BLADE INSULATED 6.5" EDGE

## (undated) DEVICE — SOL ANTI FOG

## (undated) DEVICE — SUT PROLENE 8-0 24" BV175-6

## (undated) DEVICE — ELCTR ZOLL DEFIBRILLATOR PAD NO REPLACEMENT

## (undated) DEVICE — TUBING BRAT 2 SUCTION ASSEMBLY TWIST LOCK

## (undated) DEVICE — GOWN XXL

## (undated) DEVICE — SUT PROLENE 6-0 30" RB-2